# Patient Record
Sex: FEMALE | Race: WHITE | Employment: UNEMPLOYED | URBAN - METROPOLITAN AREA
[De-identification: names, ages, dates, MRNs, and addresses within clinical notes are randomized per-mention and may not be internally consistent; named-entity substitution may affect disease eponyms.]

---

## 2018-05-09 ENCOUNTER — OFFICE VISIT (OUTPATIENT)
Dept: FAMILY MEDICINE CLINIC | Age: 28
End: 2018-05-09

## 2018-05-09 VITALS
HEART RATE: 75 BPM | WEIGHT: 132 LBS | OXYGEN SATURATION: 98 % | DIASTOLIC BLOOD PRESSURE: 64 MMHG | BODY MASS INDEX: 21.31 KG/M2 | SYSTOLIC BLOOD PRESSURE: 108 MMHG

## 2018-05-09 DIAGNOSIS — G43.109 MIGRAINE WITH TYPICAL AURA: ICD-10-CM

## 2018-05-09 DIAGNOSIS — R51.9 CHRONIC DAILY HEADACHE: Primary | ICD-10-CM

## 2018-05-09 DIAGNOSIS — R10.13 EPIGASTRIC PAIN: ICD-10-CM

## 2018-05-09 LAB
A/G RATIO: 1.6 (ref 1.1–2.2)
ALBUMIN SERPL-MCNC: 4.6 G/DL (ref 3.4–5)
ALP BLD-CCNC: 34 U/L (ref 40–129)
ALT SERPL-CCNC: 10 U/L (ref 10–40)
ANION GAP SERPL CALCULATED.3IONS-SCNC: 15 MMOL/L (ref 3–16)
AST SERPL-CCNC: 17 U/L (ref 15–37)
BILIRUB SERPL-MCNC: <0.2 MG/DL (ref 0–1)
BUN BLDV-MCNC: 10 MG/DL (ref 7–20)
CALCIUM SERPL-MCNC: 9.3 MG/DL (ref 8.3–10.6)
CHLORIDE BLD-SCNC: 101 MMOL/L (ref 99–110)
CO2: 26 MMOL/L (ref 21–32)
CREAT SERPL-MCNC: 0.6 MG/DL (ref 0.6–1.1)
GFR AFRICAN AMERICAN: >60
GFR NON-AFRICAN AMERICAN: >60
GLOBULIN: 2.9 G/DL
GLUCOSE BLD-MCNC: 86 MG/DL (ref 70–99)
HCT VFR BLD CALC: 38.2 % (ref 36–48)
HEMOGLOBIN: 12.6 G/DL (ref 12–16)
MCH RBC QN AUTO: 28.8 PG (ref 26–34)
MCHC RBC AUTO-ENTMCNC: 33 G/DL (ref 31–36)
MCV RBC AUTO: 87.3 FL (ref 80–100)
PDW BLD-RTO: 13.9 % (ref 12.4–15.4)
PLATELET # BLD: 266 K/UL (ref 135–450)
PMV BLD AUTO: 10.5 FL (ref 5–10.5)
POTASSIUM SERPL-SCNC: 4.1 MMOL/L (ref 3.5–5.1)
RBC # BLD: 4.38 M/UL (ref 4–5.2)
SODIUM BLD-SCNC: 142 MMOL/L (ref 136–145)
TOTAL PROTEIN: 7.5 G/DL (ref 6.4–8.2)
TSH REFLEX FT4: 1.74 UIU/ML (ref 0.27–4.2)
WBC # BLD: 11.6 K/UL (ref 4–11)

## 2018-05-09 PROCEDURE — 99203 OFFICE O/P NEW LOW 30 MIN: CPT | Performed by: FAMILY MEDICINE

## 2018-05-09 PROCEDURE — 36415 COLL VENOUS BLD VENIPUNCTURE: CPT | Performed by: FAMILY MEDICINE

## 2018-05-09 RX ORDER — RANITIDINE 150 MG/1
150 TABLET ORAL 2 TIMES DAILY
Qty: 60 TABLET | Refills: 5 | Status: ON HOLD | OUTPATIENT
Start: 2018-05-09 | End: 2022-02-28

## 2018-05-09 RX ORDER — NORGESTIMATE-ETHINYL ESTRADIOL 7DAYSX3 28
TABLET ORAL
Refills: 4 | Status: ON HOLD | COMMUNITY
Start: 2018-04-18 | End: 2022-02-28

## 2018-05-09 RX ORDER — AMITRIPTYLINE HYDROCHLORIDE 10 MG/1
10 TABLET, FILM COATED ORAL NIGHTLY
Qty: 30 TABLET | Refills: 5 | Status: ON HOLD | OUTPATIENT
Start: 2018-05-09 | End: 2022-03-02 | Stop reason: HOSPADM

## 2018-05-09 ASSESSMENT — PATIENT HEALTH QUESTIONNAIRE - PHQ9
1. LITTLE INTEREST OR PLEASURE IN DOING THINGS: 1
SUM OF ALL RESPONSES TO PHQ QUESTIONS 1-9: 1
SUM OF ALL RESPONSES TO PHQ9 QUESTIONS 1 & 2: 1
2. FEELING DOWN, DEPRESSED OR HOPELESS: 0

## 2018-05-15 ENCOUNTER — HOSPITAL ENCOUNTER (OUTPATIENT)
Dept: CT IMAGING | Age: 28
Discharge: HOME | End: 2018-05-15
Attending: FAMILY MEDICINE | Admitting: FAMILY MEDICINE

## 2018-05-15 DIAGNOSIS — R51.9 CHRONIC DAILY HEADACHE: ICD-10-CM

## 2018-05-15 DIAGNOSIS — R51.9 HEADACHE: ICD-10-CM

## 2018-06-21 ENCOUNTER — OFFICE VISIT (OUTPATIENT)
Dept: FAMILY MEDICINE CLINIC | Age: 28
End: 2018-06-21

## 2018-06-21 VITALS
BODY MASS INDEX: 21.14 KG/M2 | WEIGHT: 131 LBS | HEART RATE: 70 BPM | SYSTOLIC BLOOD PRESSURE: 112 MMHG | OXYGEN SATURATION: 98 % | DIASTOLIC BLOOD PRESSURE: 70 MMHG

## 2018-06-21 DIAGNOSIS — R10.13 EPIGASTRIC PAIN: Primary | ICD-10-CM

## 2018-06-21 DIAGNOSIS — K21.9 GASTROESOPHAGEAL REFLUX DISEASE WITHOUT ESOPHAGITIS: ICD-10-CM

## 2018-06-21 DIAGNOSIS — F41.9 ANXIETY: ICD-10-CM

## 2018-06-21 PROCEDURE — 99213 OFFICE O/P EST LOW 20 MIN: CPT | Performed by: FAMILY MEDICINE

## 2018-06-21 RX ORDER — OMEPRAZOLE 20 MG/1
20 CAPSULE, DELAYED RELEASE ORAL
Qty: 30 CAPSULE | Refills: 2 | Status: ON HOLD | OUTPATIENT
Start: 2018-06-21 | End: 2022-02-28

## 2018-06-22 ASSESSMENT — ENCOUNTER SYMPTOMS
CONSTIPATION: 0
ABDOMINAL PAIN: 1
BLOOD IN STOOL: 0
DIARRHEA: 0
ABDOMINAL DISTENTION: 0

## 2018-07-20 ENCOUNTER — HOSPITAL ENCOUNTER (OUTPATIENT)
Age: 28
Discharge: HOME | End: 2018-07-20
Payer: COMMERCIAL

## 2018-07-20 PROCEDURE — 83013 H PYLORI (C-13) BREATH: CPT

## 2018-07-22 LAB — H PYLORI BREATH TEST: POSITIVE

## 2018-07-26 ENCOUNTER — TELEPHONE (OUTPATIENT)
Dept: FAMILY MEDICINE CLINIC | Age: 28
End: 2018-07-26

## 2018-07-26 DIAGNOSIS — A04.8 H. PYLORI INFECTION: Primary | ICD-10-CM

## 2018-07-26 RX ORDER — LANSOPRAZOLE, AMOXICILLIN, CLARITHROMYCIN 30-500-500
KIT ORAL
Qty: 14 EACH | Refills: 0 | Status: ON HOLD | OUTPATIENT
Start: 2018-07-26 | End: 2022-02-28

## 2018-10-31 LAB
C. TRACHOMATIS, EXTERNAL RESULT: NEGATIVE
N. GONORRHOEAE, EXTERNAL RESULT: NEGATIVE

## 2018-12-03 ENCOUNTER — HOSPITAL ENCOUNTER (OUTPATIENT)
Age: 28
Discharge: HOME | End: 2018-12-03
Payer: COMMERCIAL

## 2018-12-03 LAB
ABO, EXTERNAL RESULT: NORMAL
ABO/RH: NORMAL
AMPHETAMINE SCREEN, URINE: NORMAL
ANTIBODY SCREEN: NORMAL
BARBITURATE SCREEN URINE: NORMAL
BENZODIAZEPINE SCREEN, URINE: NORMAL
BUPRENORPHINE URINE: NORMAL
CANNABINOID SCREEN URINE: NORMAL
COCAINE METABOLITE SCREEN URINE: NORMAL
HIV AG/AB: NORMAL
HIV ANTIGEN: NORMAL
HIV-1 ANTIBODY: NORMAL
HIV-2 AB: NORMAL
Lab: NORMAL
METHADONE SCREEN, URINE: NORMAL
OPIATE SCREEN URINE: NORMAL
OXYCODONE URINE: NORMAL
PH UA: 7
PHENCYCLIDINE SCREEN URINE: NORMAL
PROPOXYPHENE SCREEN: NORMAL
RHOGAM, EXTERNAL RESULT: POSITIVE

## 2018-12-03 PROCEDURE — 86901 BLOOD TYPING SEROLOGIC RH(D): CPT

## 2018-12-03 PROCEDURE — 86701 HIV-1ANTIBODY: CPT

## 2018-12-03 PROCEDURE — 36415 COLL VENOUS BLD VENIPUNCTURE: CPT

## 2018-12-03 PROCEDURE — 87390 HIV-1 AG IA: CPT

## 2018-12-03 PROCEDURE — 86900 BLOOD TYPING SEROLOGIC ABO: CPT

## 2018-12-03 PROCEDURE — 87086 URINE CULTURE/COLONY COUNT: CPT

## 2018-12-03 PROCEDURE — 86702 HIV-2 ANTIBODY: CPT

## 2018-12-03 PROCEDURE — 86850 RBC ANTIBODY SCREEN: CPT

## 2018-12-04 LAB — URINE CULTURE, ROUTINE: NORMAL

## 2019-01-16 ENCOUNTER — HOSPITAL ENCOUNTER (OUTPATIENT)
Age: 29
Discharge: HOME | End: 2019-01-16
Payer: COMMERCIAL

## 2019-01-16 LAB
BASOPHILS ABSOLUTE: 0.1 K/UL (ref 0–0.2)
BASOPHILS RELATIVE PERCENT: 0.7 %
EOSINOPHILS ABSOLUTE: 0.1 K/UL (ref 0–0.6)
EOSINOPHILS RELATIVE PERCENT: 0.9 %
HCT VFR BLD CALC: 34 % (ref 36–48)
HEMOGLOBIN: 11.5 G/DL (ref 12–16)
LYMPHOCYTES ABSOLUTE: 1.8 K/UL (ref 1–5.1)
LYMPHOCYTES RELATIVE PERCENT: 18.2 %
MCH RBC QN AUTO: 30.9 PG (ref 26–34)
MCHC RBC AUTO-ENTMCNC: 33.8 G/DL (ref 31–36)
MCV RBC AUTO: 91.5 FL (ref 80–100)
MONOCYTES ABSOLUTE: 0.7 K/UL (ref 0–1.3)
MONOCYTES RELATIVE PERCENT: 7.2 %
NEUTROPHILS ABSOLUTE: 7.3 K/UL (ref 1.7–7.7)
NEUTROPHILS RELATIVE PERCENT: 73 %
PDW BLD-RTO: 14.3 % (ref 12.4–15.4)
PLATELET # BLD: 250 K/UL (ref 135–450)
PMV BLD AUTO: 9.9 FL (ref 5–10.5)
RBC # BLD: 3.71 M/UL (ref 4–5.2)
RUBELLA ANTIBODY IGG: 174.9 IU/ML
WBC # BLD: 10 K/UL (ref 4–11)

## 2019-01-16 PROCEDURE — 85025 COMPLETE CBC W/AUTO DIFF WBC: CPT

## 2019-01-16 PROCEDURE — 86704 HEP B CORE ANTIBODY TOTAL: CPT

## 2019-01-16 PROCEDURE — 86762 RUBELLA ANTIBODY: CPT

## 2019-01-16 PROCEDURE — 36415 COLL VENOUS BLD VENIPUNCTURE: CPT

## 2019-01-18 LAB — HEPATITIS B CORE TOTAL ANTIBODY: NEGATIVE

## 2019-03-20 ENCOUNTER — HOSPITAL ENCOUNTER (OUTPATIENT)
Age: 29
Discharge: HOME | End: 2019-03-20
Payer: COMMERCIAL

## 2019-03-20 LAB
BASOPHILS ABSOLUTE: 0 K/UL (ref 0–0.2)
BASOPHILS RELATIVE PERCENT: 0.3 %
EOSINOPHILS ABSOLUTE: 0.1 K/UL (ref 0–0.6)
EOSINOPHILS RELATIVE PERCENT: 0.7 %
GLUCOSE CHALLENGE: 141 MG/DL
HCT VFR BLD CALC: 32.1 % (ref 36–48)
HEMOGLOBIN: 10.6 G/DL (ref 12–16)
LYMPHOCYTES ABSOLUTE: 1.9 K/UL (ref 1–5.1)
LYMPHOCYTES RELATIVE PERCENT: 16.7 %
MCH RBC QN AUTO: 31.1 PG (ref 26–34)
MCHC RBC AUTO-ENTMCNC: 33.1 G/DL (ref 31–36)
MCV RBC AUTO: 93.8 FL (ref 80–100)
MONOCYTES ABSOLUTE: 0.8 K/UL (ref 0–1.3)
MONOCYTES RELATIVE PERCENT: 7.1 %
NEUTROPHILS ABSOLUTE: 8.5 K/UL (ref 1.7–7.7)
NEUTROPHILS RELATIVE PERCENT: 75.2 %
PDW BLD-RTO: 12.7 % (ref 12.4–15.4)
PLATELET # BLD: 223 K/UL (ref 135–450)
PMV BLD AUTO: 8 FL (ref 5–10.5)
RBC # BLD: 3.43 M/UL (ref 4–5.2)
WBC # BLD: 11.3 K/UL (ref 4–11)

## 2019-03-20 PROCEDURE — 82950 GLUCOSE TEST: CPT

## 2019-03-20 PROCEDURE — 36415 COLL VENOUS BLD VENIPUNCTURE: CPT

## 2019-03-20 PROCEDURE — 85025 COMPLETE CBC W/AUTO DIFF WBC: CPT

## 2019-03-25 ENCOUNTER — HOSPITAL ENCOUNTER (OUTPATIENT)
Age: 29
Discharge: HOME | End: 2019-03-25
Payer: COMMERCIAL

## 2019-03-25 LAB
GLUCOSE FASTING: 81 MG/DL
GLUCOSE TOLERANCE TEST 1 HOUR: 117 MG/DL
GLUCOSE TOLERANCE TEST 2 HOUR: 118 MG/DL
GLUCOSE TOLERANCE TEST 3 HOUR: 84 MG/DL

## 2019-03-25 PROCEDURE — 82951 GLUCOSE TOLERANCE TEST (GTT): CPT

## 2019-03-25 PROCEDURE — 36415 COLL VENOUS BLD VENIPUNCTURE: CPT

## 2019-03-25 PROCEDURE — 82952 GTT-ADDED SAMPLES: CPT

## 2019-03-28 ENCOUNTER — HOSPITAL ENCOUNTER (OUTPATIENT)
Age: 29
Discharge: HOME | End: 2019-03-28
Attending: OBSTETRICS & GYNECOLOGY | Admitting: OBSTETRICS & GYNECOLOGY
Payer: COMMERCIAL

## 2019-03-28 ENCOUNTER — NURSE TRIAGE (OUTPATIENT)
Dept: OTHER | Facility: CLINIC | Age: 29
End: 2019-03-28

## 2019-03-28 VITALS — DIASTOLIC BLOOD PRESSURE: 76 MMHG | SYSTOLIC BLOOD PRESSURE: 135 MMHG | RESPIRATION RATE: 16 BRPM | HEART RATE: 87 BPM

## 2019-03-28 PROBLEM — Z91.81 STATUS POST FALL: Status: ACTIVE | Noted: 2019-03-28

## 2019-03-28 PROCEDURE — 99211 OFF/OP EST MAY X REQ PHY/QHP: CPT

## 2019-03-28 RX ORDER — FERROUS SULFATE 325(65) MG
325 TABLET ORAL
Status: ON HOLD | COMMUNITY
End: 2019-06-21 | Stop reason: HOSPADM

## 2019-03-28 NOTE — FLOWSHEET NOTE
Pt verbalized understanding of verbal and written discharge instructions and denies having questions at this time. Pt able to ambulate off unit, undelivered, in stable condition, and accompanied by .

## 2019-03-28 NOTE — PROGRESS NOTES
Kumar Stevenson CNM notified of patient arrival and details of fall. Orders received to continue EFM and have house MD perform ultrasound.

## 2019-03-28 NOTE — PROGRESS NOTES
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 She denies any pain. States baby is moving well and denies cramping, leakage of fluid, and vaginal bleeding. EFM placed.

## 2019-03-28 NOTE — H&P
[address] [facility]   Obstetrics Triage History and Physical        CHIEF COMPLAINT:  Charles Hooker while working in ED here at about 37 today and landed on abdomen. No known injury. Feeling baby move. No LOF or bleeding. HISTORY OF PRESENT ILLNESS:      The patient is a 33 y.o.   parity  at weeks. Patient presents with a chief complaint as above and is being admitted to triage for observation s/p fall. DATES:    Estimated Due Date:  19    PRENATAL CARE:    Provider: Joseline Barraza CNM         Past Medical History:        Diagnosis Date    Anemia     Taking iron with pregnancy    H. pylori infection 2018    Migraine     has aura     Past Surgical History:    History reviewed. No pertinent surgical history. Social History:    TOBACCO:    Family History:       Problem Relation Age of Onset    High Cholesterol Mother     Cancer Maternal Grandmother         lung    Diabetes Maternal Aunt     COPD Maternal Aunt     Seizures Maternal Aunt         epilepsy     Medications Prior to Admission:  Medications Prior to Admission: Prenatal MV-Min-Fe Fum-FA-DHA (PRENATAL 1 PO), Take by mouth  ferrous sulfate 325 (65 Fe) MG tablet, Take 325 mg by mouth daily (with breakfast)  amoxicillin-clarithromycin-lansoprazole (PREVPAC) combo pack, UAD  omeprazole (PRILOSEC) 20 MG delayed release capsule, Take 1 capsule by mouth every morning (before breakfast) To replace the zantac  TRINESSA, 28, 0.18/0.215/0.25 MG-35 MCG TABS, TK 1 T PO QD  ranitidine (ZANTAC) 150 MG tablet, Take 1 tablet by mouth 2 times daily  amitriptyline (ELAVIL) 10 MG tablet, Take 1 tablet by mouth nightly  SUMAtriptan (IMITREX) 50 MG tablet, Take 1 tablet by mouth once as needed for Migraine  butalbital-aspirin-caffeine (FIORINAL) -40 MG capsule, Take 1 capsule by mouth every 4 hours as needed for Headaches for up to 30 days.   ondansetron (ZOFRAN) 4 MG tablet, Take 1 tablet by mouth every 8 hours as needed for Nausea     Allergies: Patient has no known allergies.     REVIEW OF SYSTEMS:    No specific complaints       PHYSICAL EXAM:    General appearance:  awake, alert, cooperative, no apparent distress, and appears stated age  Neurologic:  Oriented  Lungs:  Breathing unlabored  Heart:    Abdomen:  Soft, gravid  Fetal heart rate:  135  Pelvis:    Cervix:    DILATION:    EFFACEMENT:     STATION:      Contraction frequency:  None  Membranes:  intact      ASSESSMENT:    32 yo  at 28.6 weeks EGA  S/P fall on abdomen  FHR category 1      PLAN:     Observe X 4 hours then reassess  Discussed plan with Dr. Maldonado Peterson monitoring  Reevaluate in 4 hours/prn    Erica Quigley CNM

## 2019-04-01 ENCOUNTER — NURSE ONLY (OUTPATIENT)
Dept: FAMILY MEDICINE CLINIC | Age: 29
End: 2019-04-01
Payer: COMMERCIAL

## 2019-04-01 DIAGNOSIS — Z23 NEED FOR TDAP VACCINATION: Primary | ICD-10-CM

## 2019-04-01 PROCEDURE — 90715 TDAP VACCINE 7 YRS/> IM: CPT | Performed by: FAMILY MEDICINE

## 2019-04-01 PROCEDURE — 90471 IMMUNIZATION ADMIN: CPT | Performed by: FAMILY MEDICINE

## 2019-05-01 ENCOUNTER — HOSPITAL ENCOUNTER (OUTPATIENT)
Age: 29
Discharge: HOME | End: 2019-05-01
Payer: COMMERCIAL

## 2019-05-01 LAB — HEPATITIS B SURFACE ANTIGEN INTERPRETATION: NORMAL

## 2019-05-01 PROCEDURE — 36415 COLL VENOUS BLD VENIPUNCTURE: CPT

## 2019-05-01 PROCEDURE — 86780 TREPONEMA PALLIDUM: CPT

## 2019-05-01 PROCEDURE — 87340 HEPATITIS B SURFACE AG IA: CPT

## 2019-05-02 LAB — TOTAL SYPHILLIS IGG/IGM: NORMAL

## 2019-05-15 LAB — GBS, EXTERNAL RESULT: NEGATIVE

## 2019-06-18 ENCOUNTER — APPOINTMENT (OUTPATIENT)
Dept: LABOR AND DELIVERY | Age: 29
End: 2019-06-18
Payer: COMMERCIAL

## 2019-06-18 ENCOUNTER — HOSPITAL ENCOUNTER (INPATIENT)
Age: 29
LOS: 3 days | Discharge: HOME | End: 2019-06-21
Attending: OBSTETRICS & GYNECOLOGY | Admitting: OBSTETRICS & GYNECOLOGY
Payer: COMMERCIAL

## 2019-06-18 PROBLEM — Z91.81 STATUS POST FALL: Status: RESOLVED | Noted: 2019-03-28 | Resolved: 2019-06-18

## 2019-06-18 PROBLEM — Z34.90 NORMAL PREGNANCY: Status: ACTIVE | Noted: 2019-06-18

## 2019-06-18 PROBLEM — A04.8 H. PYLORI INFECTION: Status: RESOLVED | Noted: 2018-07-26 | Resolved: 2019-06-18

## 2019-06-18 LAB
ABO/RH: NORMAL
AMPHETAMINE SCREEN, URINE: ABNORMAL
ANTIBODY SCREEN: NORMAL
BARBITURATE SCREEN URINE: POSITIVE
BASOPHILS ABSOLUTE: 0 K/UL (ref 0–0.2)
BASOPHILS RELATIVE PERCENT: 0.3 %
BENZODIAZEPINE SCREEN, URINE: ABNORMAL
BUPRENORPHINE URINE: ABNORMAL
CANNABINOID SCREEN URINE: ABNORMAL
COCAINE METABOLITE SCREEN URINE: ABNORMAL
EOSINOPHILS ABSOLUTE: 0 K/UL (ref 0–0.6)
EOSINOPHILS RELATIVE PERCENT: 0.3 %
HCT VFR BLD CALC: 33.8 % (ref 36–48)
HEMOGLOBIN: 11.6 G/DL (ref 12–16)
LYMPHOCYTES ABSOLUTE: 1.9 K/UL (ref 1–5.1)
LYMPHOCYTES RELATIVE PERCENT: 20.1 %
Lab: ABNORMAL
MCH RBC QN AUTO: 31.4 PG (ref 26–34)
MCHC RBC AUTO-ENTMCNC: 34.2 G/DL (ref 31–36)
MCV RBC AUTO: 91.8 FL (ref 80–100)
METHADONE SCREEN, URINE: ABNORMAL
MONOCYTES ABSOLUTE: 0.6 K/UL (ref 0–1.3)
MONOCYTES RELATIVE PERCENT: 6 %
NEUTROPHILS ABSOLUTE: 6.9 K/UL (ref 1.7–7.7)
NEUTROPHILS RELATIVE PERCENT: 73.3 %
OPIATE SCREEN URINE: ABNORMAL
OXYCODONE URINE: ABNORMAL
PDW BLD-RTO: 12.9 % (ref 12.4–15.4)
PH UA: 7
PHENCYCLIDINE SCREEN URINE: ABNORMAL
PLATELET # BLD: 209 K/UL (ref 135–450)
PMV BLD AUTO: 9.9 FL (ref 5–10.5)
PROPOXYPHENE SCREEN: ABNORMAL
RBC # BLD: 3.69 M/UL (ref 4–5.2)
WBC # BLD: 9.4 K/UL (ref 4–11)

## 2019-06-18 PROCEDURE — 86780 TREPONEMA PALLIDUM: CPT

## 2019-06-18 PROCEDURE — 85025 COMPLETE CBC W/AUTO DIFF WBC: CPT

## 2019-06-18 PROCEDURE — 1220000000 HC SEMI PRIVATE OB R&B

## 2019-06-18 PROCEDURE — 86850 RBC ANTIBODY SCREEN: CPT

## 2019-06-18 PROCEDURE — 80307 DRUG TEST PRSMV CHEM ANLYZR: CPT

## 2019-06-18 PROCEDURE — 6370000000 HC RX 637 (ALT 250 FOR IP): Performed by: ADVANCED PRACTICE MIDWIFE

## 2019-06-18 PROCEDURE — 86900 BLOOD TYPING SEROLOGIC ABO: CPT

## 2019-06-18 PROCEDURE — 86901 BLOOD TYPING SEROLOGIC RH(D): CPT

## 2019-06-18 PROCEDURE — 3E0P7VZ INTRODUCTION OF HORMONE INTO FEMALE REPRODUCTIVE, VIA NATURAL OR ARTIFICIAL OPENING: ICD-10-PCS | Performed by: OBSTETRICS & GYNECOLOGY

## 2019-06-18 RX ORDER — ONDANSETRON 2 MG/ML
4 INJECTION INTRAMUSCULAR; INTRAVENOUS EVERY 6 HOURS PRN
Status: DISCONTINUED | OUTPATIENT
Start: 2019-06-18 | End: 2019-06-19

## 2019-06-18 RX ORDER — SODIUM CHLORIDE 0.9 % (FLUSH) 0.9 %
10 SYRINGE (ML) INJECTION PRN
Status: DISCONTINUED | OUTPATIENT
Start: 2019-06-18 | End: 2019-06-19

## 2019-06-18 RX ORDER — SODIUM CHLORIDE, SODIUM LACTATE, POTASSIUM CHLORIDE, CALCIUM CHLORIDE 600; 310; 30; 20 MG/100ML; MG/100ML; MG/100ML; MG/100ML
INJECTION, SOLUTION INTRAVENOUS CONTINUOUS
Status: DISCONTINUED | OUTPATIENT
Start: 2019-06-18 | End: 2019-06-19

## 2019-06-18 RX ORDER — ACETAMINOPHEN AND CODEINE PHOSPHATE 300; 30 MG/1; MG/1
2 TABLET ORAL NIGHTLY
Status: DISCONTINUED | OUTPATIENT
Start: 2019-06-18 | End: 2019-06-19

## 2019-06-18 RX ORDER — ACETAMINOPHEN 325 MG/1
650 TABLET ORAL EVERY 4 HOURS PRN
Status: DISCONTINUED | OUTPATIENT
Start: 2019-06-18 | End: 2019-06-19

## 2019-06-18 RX ORDER — SODIUM CHLORIDE 0.9 % (FLUSH) 0.9 %
SYRINGE (ML) INJECTION
Status: DISCONTINUED
Start: 2019-06-18 | End: 2019-06-19

## 2019-06-18 RX ORDER — SODIUM CHLORIDE, SODIUM LACTATE, POTASSIUM CHLORIDE, CALCIUM CHLORIDE 600; 310; 30; 20 MG/100ML; MG/100ML; MG/100ML; MG/100ML
INJECTION, SOLUTION INTRAVENOUS
Status: DISCONTINUED
Start: 2019-06-18 | End: 2019-06-19

## 2019-06-18 RX ORDER — DOCUSATE SODIUM 100 MG/1
100 CAPSULE, LIQUID FILLED ORAL 2 TIMES DAILY
Status: DISCONTINUED | OUTPATIENT
Start: 2019-06-18 | End: 2019-06-19

## 2019-06-18 RX ADMIN — ACETAMINOPHEN AND CODEINE PHOSPHATE 2 TABLET: 300; 30 TABLET ORAL at 21:28

## 2019-06-18 RX ADMIN — DINOPROSTONE 10 MG: 10 INSERT VAGINAL at 18:15

## 2019-06-18 ASSESSMENT — PAIN SCALES - GENERAL: PAINLEVEL_OUTOF10: 7

## 2019-06-18 NOTE — PLAN OF CARE
Problem: Anxiety:  Goal: Level of anxiety will decrease  Description  Level of anxiety will decrease  Outcome: Ongoing     Problem: Breathing Pattern - Ineffective:  Goal: Able to breathe comfortably  Description  Able to breathe comfortably  Outcome: Ongoing     Problem: Fluid Volume - Imbalance:  Goal: Absence of imbalanced fluid volume signs and symptoms  Description  Absence of imbalanced fluid volume signs and symptoms  Outcome: Ongoing     Problem: Infection - Intrapartum Infection:  Goal: Will show no infection signs and symptoms  Description  Will show no infection signs and symptoms  Outcome: Ongoing     Problem: Labor Process - Prolonged:  Goal: Labor progression, first stage, within specified pattern  Description  Labor progression, first stage, within specified pattern  Outcome: Ongoing     Problem: Pain - Acute:  Goal: Pain level will decrease  Description  Pain level will decrease  Outcome: Ongoing  Goal: Able to cope with pain  Description  Able to cope with pain  Outcome: Ongoing     Problem: Tissue Perfusion - Uteroplacental, Altered:  Goal: Absence of abnormal fetal heart rate pattern  Description  Absence of abnormal fetal heart rate pattern  Outcome: Ongoing

## 2019-06-18 NOTE — H&P
Department of Obstetrics and Gynecology   Obstetrics History and Physical        CHIEF COMPLAINT:  Here for post-dates IOL    HISTORY OF PRESENT ILLNESS:      The patient is a 30 y.o. female at 36w2d. OB History        1    Para        Term                AB        Living           SAB        TAB        Ectopic        Molar        Multiple        Live Births              Obstetric Comments   G0         Patient presents with a chief complaint as above and is being admitted for induction    Estimated Due Date: Estimated Date of Delivery: 19    PRENATAL CARE:    Complicated by: migraine HA, cluster HA    PAST OB HISTORY:  OB History        1    Para        Term                AB        Living           SAB        TAB        Ectopic        Molar        Multiple        Live Births              Obstetric Comments   G0             Past Medical History:        Diagnosis Date    Anemia     Taking iron with pregnancy    H. pylori infection 2018    Migraine     has aura     Past Surgical History:    History reviewed. No pertinent surgical history. Allergies:  Patient has no known allergies. Social History:    Social History     Socioeconomic History    Marital status: Single     Spouse name: Not on file    Number of children: Not on file    Years of education: Not on file    Highest education level: Not on file   Occupational History    Not on file   Social Needs    Financial resource strain: Not on file    Food insecurity:     Worry: Not on file     Inability: Not on file    Transportation needs:     Medical: Not on file     Non-medical: Not on file   Tobacco Use    Smoking status: Never Smoker    Smokeless tobacco: Never Used   Substance and Sexual Activity    Alcohol use:  Yes     Alcohol/week: 0.0 oz    Drug use: No    Sexual activity: Yes     Partners: Male   Lifestyle    Physical activity:     Days per week: Not on file     Minutes per session: Not on file    Stress: Not on file   Relationships    Social connections:     Talks on phone: Not on file     Gets together: Not on file     Attends [employer] service: Not on file     Active member of club or organization: Not on file     Attends meetings of clubs or organizations: Not on file     Relationship status: Not on file    Intimate partner violence:     Fear of current or ex partner: Not on file     Emotionally abused: Not on file     Physically abused: Not on file     Forced sexual activity: Not on file   Other Topics Concern    Not on file   Social History Narrative    Not on file     Family History:       Problem Relation Age of Onset    High Cholesterol Mother     Cancer Maternal Grandmother         lung    Diabetes Maternal Aunt     COPD Maternal Aunt     Seizures Maternal Aunt         epilepsy     Medications Prior to Admission:  Medications Prior to Admission: Prenatal MV-Min-Fe Fum-FA-DHA (PRENATAL 1 PO), Take by mouth  ferrous sulfate 325 (65 Fe) MG tablet, Take 325 mg by mouth daily (with breakfast)  butalbital-aspirin-caffeine (FIORINAL) -40 MG capsule, Take 1 capsule by mouth every 4 hours as needed for Headaches for up to 30 days.   ondansetron (ZOFRAN) 4 MG tablet, Take 1 tablet by mouth every 8 hours as needed for Nausea  amoxicillin-clarithromycin-lansoprazole (PREVPAC) combo pack, UAD  omeprazole (PRILOSEC) 20 MG delayed release capsule, Take 1 capsule by mouth every morning (before breakfast) To replace the zantac  TRINESSA, 28, 0.18/0.215/0.25 MG-35 MCG TABS, TK 1 T PO QD  ranitidine (ZANTAC) 150 MG tablet, Take 1 tablet by mouth 2 times daily  amitriptyline (ELAVIL) 10 MG tablet, Take 1 tablet by mouth nightly  SUMAtriptan (IMITREX) 50 MG tablet, Take 1 tablet by mouth once as needed for Migraine    REVIEW OF SYSTEMS:    N/A    PHYSICAL EXAM:  Vitals:    06/18/19 1637 06/18/19 1721   BP: 129/71    Pulse: 90    Resp: 16    Temp: 98.5 °F (36.9 °C)    TempSrc: Oral    Weight:  172 lb (78 kg) Height:  5' 6\" (1.676 m)     General appearance:  awake, alert, cooperative, no apparent distress, and appears stated age  Neurologic:  Awake, alert, oriented to name, place and time. Lungs:  No increased work of breathing, good air exchange  Abdomen:  Soft, non tender, gravid, consistent with her gestational age, EFW by Leopold's maneuver was 7.5lbs   Fetal heart rate:  Reassuring.   Pelvis:  Adequate pelvis  Cervix: 1-2/50/-3  Contraction frequency:  Occasional    Membranes:  Intact    Labs:   CBC with Differential:    Lab Results   Component Value Date    WBC 9.4 06/18/2019    RBC 3.69 06/18/2019    HGB 11.6 06/18/2019    HCT 33.8 06/18/2019     06/18/2019    MCV 91.8 06/18/2019    MCH 31.4 06/18/2019    MCHC 34.2 06/18/2019    RDW 12.9 06/18/2019    LYMPHOPCT 20.1 06/18/2019    MONOPCT 6.0 06/18/2019    BASOPCT 0.3 06/18/2019    MONOSABS 0.6 06/18/2019    LYMPHSABS 1.9 06/18/2019    EOSABS 0.0 06/18/2019    BASOSABS 0.0 06/18/2019       ASSESSMENT AND PLAN:    Labor: Admit, anticipate normal delivery, routine labor orders  Fetus: Reassuring  GBS: No  Other: Cervidil  Tylenol #3 tonight for sleep and HA

## 2019-06-19 ENCOUNTER — ANESTHESIA EVENT (OUTPATIENT)
Dept: LABOR AND DELIVERY | Age: 29
End: 2019-06-19
Payer: COMMERCIAL

## 2019-06-19 ENCOUNTER — ANESTHESIA (OUTPATIENT)
Dept: LABOR AND DELIVERY | Age: 29
End: 2019-06-19
Payer: COMMERCIAL

## 2019-06-19 PROBLEM — Z34.90 ENCOUNTER FOR ELECTIVE INDUCTION OF LABOR: Status: ACTIVE | Noted: 2019-06-19

## 2019-06-19 LAB — TOTAL SYPHILLIS IGG/IGM: NORMAL

## 2019-06-19 PROCEDURE — 3700000025 EPIDURAL BLOCK: Performed by: ANESTHESIOLOGY

## 2019-06-19 PROCEDURE — 7200000001 HC VAGINAL DELIVERY

## 2019-06-19 PROCEDURE — 1220000000 HC SEMI PRIVATE OB R&B

## 2019-06-19 PROCEDURE — 51701 INSERT BLADDER CATHETER: CPT

## 2019-06-19 PROCEDURE — 0KQM0ZZ REPAIR PERINEUM MUSCLE, OPEN APPROACH: ICD-10-PCS | Performed by: OBSTETRICS & GYNECOLOGY

## 2019-06-19 PROCEDURE — 6370000000 HC RX 637 (ALT 250 FOR IP): Performed by: ADVANCED PRACTICE MIDWIFE

## 2019-06-19 PROCEDURE — 3E033VJ INTRODUCTION OF OTHER HORMONE INTO PERIPHERAL VEIN, PERCUTANEOUS APPROACH: ICD-10-PCS | Performed by: OBSTETRICS & GYNECOLOGY

## 2019-06-19 PROCEDURE — 2580000003 HC RX 258: Performed by: ADVANCED PRACTICE MIDWIFE

## 2019-06-19 PROCEDURE — 6360000002 HC RX W HCPCS

## 2019-06-19 RX ORDER — IBUPROFEN 800 MG/1
800 TABLET ORAL EVERY 8 HOURS PRN
Status: DISCONTINUED | OUTPATIENT
Start: 2019-06-19 | End: 2019-06-21 | Stop reason: HOSPADM

## 2019-06-19 RX ORDER — DOCUSATE SODIUM 100 MG/1
100 CAPSULE, LIQUID FILLED ORAL 2 TIMES DAILY
Status: DISCONTINUED | OUTPATIENT
Start: 2019-06-19 | End: 2019-06-21 | Stop reason: HOSPADM

## 2019-06-19 RX ORDER — ACETAMINOPHEN 325 MG/1
650 TABLET ORAL EVERY 4 HOURS PRN
Status: DISCONTINUED | OUTPATIENT
Start: 2019-06-19 | End: 2019-06-21 | Stop reason: HOSPADM

## 2019-06-19 RX ORDER — SODIUM CHLORIDE 0.9 % (FLUSH) 0.9 %
10 SYRINGE (ML) INJECTION PRN
Status: DISCONTINUED | OUTPATIENT
Start: 2019-06-19 | End: 2019-06-21 | Stop reason: HOSPADM

## 2019-06-19 RX ORDER — FERROUS SULFATE 325(65) MG
325 TABLET ORAL 2 TIMES DAILY WITH MEALS
Status: DISCONTINUED | OUTPATIENT
Start: 2019-06-19 | End: 2019-06-21 | Stop reason: HOSPADM

## 2019-06-19 RX ORDER — SODIUM CHLORIDE 0.9 % (FLUSH) 0.9 %
10 SYRINGE (ML) INJECTION EVERY 12 HOURS SCHEDULED
Status: DISCONTINUED | OUTPATIENT
Start: 2019-06-19 | End: 2019-06-21 | Stop reason: HOSPADM

## 2019-06-19 RX ORDER — LANOLIN 100 %
OINTMENT (GRAM) TOPICAL PRN
Status: DISCONTINUED | OUTPATIENT
Start: 2019-06-19 | End: 2019-06-21 | Stop reason: HOSPADM

## 2019-06-19 RX ORDER — SODIUM CHLORIDE, SODIUM LACTATE, POTASSIUM CHLORIDE, CALCIUM CHLORIDE 600; 310; 30; 20 MG/100ML; MG/100ML; MG/100ML; MG/100ML
INJECTION, SOLUTION INTRAVENOUS CONTINUOUS
Status: DISCONTINUED | OUTPATIENT
Start: 2019-06-19 | End: 2019-06-21 | Stop reason: HOSPADM

## 2019-06-19 RX ORDER — HYDROCODONE BITARTRATE AND ACETAMINOPHEN 5; 325 MG/1; MG/1
1 TABLET ORAL EVERY 6 HOURS PRN
Status: DISCONTINUED | OUTPATIENT
Start: 2019-06-19 | End: 2019-06-21 | Stop reason: HOSPADM

## 2019-06-19 RX ADMIN — SODIUM CHLORIDE, POTASSIUM CHLORIDE, SODIUM LACTATE AND CALCIUM CHLORIDE: 600; 310; 30; 20 INJECTION, SOLUTION INTRAVENOUS at 01:00

## 2019-06-19 RX ADMIN — HYDROCODONE BITARTRATE AND ACETAMINOPHEN 1 TABLET: 5; 325 TABLET ORAL at 20:14

## 2019-06-19 RX ADMIN — Medication 1 MILLI-UNITS/MIN: at 06:54

## 2019-06-19 RX ADMIN — BENZOCAINE AND LEVOMENTHOL: 200; 5 SPRAY TOPICAL at 12:22

## 2019-06-19 RX ADMIN — SODIUM CHLORIDE, POTASSIUM CHLORIDE, SODIUM LACTATE AND CALCIUM CHLORIDE: 600; 310; 30; 20 INJECTION, SOLUTION INTRAVENOUS at 06:41

## 2019-06-19 RX ADMIN — DOCUSATE SODIUM 100 MG: 100 CAPSULE, LIQUID FILLED ORAL at 13:51

## 2019-06-19 RX ADMIN — WITCH HAZEL 40 EACH: 500 SOLUTION RECTAL; TOPICAL at 12:22

## 2019-06-19 RX ADMIN — SODIUM CHLORIDE, POTASSIUM CHLORIDE, SODIUM LACTATE AND CALCIUM CHLORIDE: 600; 310; 30; 20 INJECTION, SOLUTION INTRAVENOUS at 01:21

## 2019-06-19 RX ADMIN — IBUPROFEN 800 MG: 800 TABLET, FILM COATED ORAL at 13:50

## 2019-06-19 RX ADMIN — DOCUSATE SODIUM 100 MG: 100 CAPSULE, LIQUID FILLED ORAL at 20:14

## 2019-06-19 ASSESSMENT — PAIN SCALES - GENERAL
PAINLEVEL_OUTOF10: 6
PAINLEVEL_OUTOF10: 6

## 2019-06-19 NOTE — PROGRESS NOTES
Pt called out with c/o contraction pain 9/10. Pt requesting epidural.  VE performed 2-3/90/-2. Kaleb Cartwright CNM notified.  Orders received for epidural.

## 2019-06-19 NOTE — LACTATION NOTE
This note was copied from a baby's chart. Lactation Progress Note      Data:   RN requesting initial consult with primip who recently delivered. Infant currently STS starting to suck on hand. Action: Introduced self to patient as Lactation RN, name and phone number written on white board in room. Infant moved into cross cradle position and infant began moving mouth around nipple. After approximately 5 minutes, infant began opening wide, mother shown how to hold breast in a C position and infant latched on with SHERWIN, SRS and AS. Reviewed with mother what to expect over the next  24-48 hours with infant feedings, infant output, and breast care. Educated mother on how to hand express colostrum. Reviewed infant feeding cues and encouraged mother to allow infant to breast feed on demand, a minimum of every 3 hours. If infant sleepy in the first 24 hours, mother instructed to hand express drops of colostrum into infant's mouth. Also encouraged mother to avoid giving infant a pacifier or bottle for at least the first two weeks of life. Binder and breast feeding log reviewed, all questions answered. Response: Pleased with first feed. Encouraged to call Lactation nurse for F/U care as needed.

## 2019-06-19 NOTE — OP NOTE
Department of Obstetrics and Gynecology  Spontaneous Vaginal Delivery Note    Labor & Delivery Summary  Dilation Complete Date: 19  Dilation Complete Time: 910    Pre-operative Diagnosis:  Term pregnancy, complicated by chronic migraine headaches    Post-operative Diagnosis:  Living  infant(s) and Male    Procedure:  Spontaneous vaginal delivery or Repair second degree spontaneous laceration    Surgeon:   Kurt Hanley CNM    Information for the patient's :  Rui Aguiar [[id number]]          Anesthesia:  epidural anesthesia    Estimated blood loss:  350ml    Specimen:  Placenta not sent to pathology     Cord blood sent No    Complications:  none    Condition:  infant stable and skin to skin with the mother and mother stable    Details of Procedure: The patient is a 30 y.o. female at 39w6d   OB History        1    Para        Term                AB        Living           SAB        TAB        Ectopic        Molar        Multiple        Live Births                 who was admitted for induction. She received the following interventions: cervidil, pitocin. Max pitocin was 2 miu. Membranes ruptured spontaneously this am with clear fluid. She was known to be GBS negative and did not receive antibiotic prophylaxis. The patient progressed well,did receive an epidural, became complete and started to push. After pushing for 10 minutes the fetal head was at the perineum, there was a loose nuchal cord that was reduced over the head and the rest of the infant delivered atraumatically and was placed on the mother's abdomen. The infant established good color, tone and cry at delivery. Cord was clamped and cut and infant was placed skin to skin with the mother. The delivery of the placenta was spontaneous. The perineum and vagina were explored and a second degree laceration was repaired in standard fashion. This mother plans to breast feed.  Mother, baby and father entered into the post partum period in excellent condition. Dr Roopa Rodríguez was notified of this birth.

## 2019-06-19 NOTE — ANESTHESIA PRE PROCEDURE
mL/hr at 06/19/19 0121      sodium chloride flush 0.9 % injection 10 mL  10 mL Intravenous PRN Oscar Louis, APRN - CNM        acetaminophen (TYLENOL) tablet 650 mg  650 mg Oral Q4H PRN Oscar Louis, APRN - CNM        docusate sodium (COLACE) capsule 100 mg  100 mg Oral BID Oscar Louis, APRN - CNM        ondansetron [facility]) injection 4 mg  4 mg Intravenous Q6H PRN Oscar Louis, APRN - CNM        benzocaine-menthol (DERMOPLAST) 20-0.5 % spray   Topical PRN [address] [place], APRN - CNM        acetaminophen-codeine (TYLENOL #3) 300-30 MG per tablet 2 tablet  2 tablet Oral Nightly Oscar Louis, APRN - CNM   2 tablet at 06/18/19 2128    sodium chloride flush 0.9 % injection                Allergies:  No Known Allergies    Problem List:    Patient Active Problem List   Diagnosis Code    Normal pregnancy Z34.90       Past Medical History:        Diagnosis Date    Anemia     Taking iron with pregnancy    H. pylori infection 2018    Migraine     has aura       Past Surgical History:  History reviewed. No pertinent surgical history. Social History:    Social History     Tobacco Use    Smoking status: Never Smoker    Smokeless tobacco: Never Used   Substance Use Topics    Alcohol use:  Yes     Alcohol/week: 0.0 oz                                Counseling given: Not Answered      Vital Signs (Current):   Vitals:    06/18/19 2030 06/19/19 0101 06/19/19 0116 06/19/19 0119   BP: 128/80 (!) 143/79 139/77 134/76   Pulse: 85 96 97 92   Resp: 16      Temp: 36.7 °C (98.1 °F)      TempSrc:       Weight:       Height:                                                  BP Readings from Last 3 Encounters:   06/19/19 134/76   03/28/19 135/76   06/21/18 112/70       NPO Status:                                                                                 BMI:   Wt Readings from Last 3 Encounters:   06/18/19 172 lb (78 kg)   06/21/18 131 lb (59.4 kg)   05/09/18 132 lb (59.9 kg)

## 2019-06-19 NOTE — L&D DELIVERY SUMMARY NOTE
[address] [address]                            LABOR AND DELIVERY NOTE    PATIENT NAME: Juan Carlos Van                  :        1990  MED REC NO:   [id number]                          ROOM:       0372  ACCOUNT NO:   [de-identified]                           ADMIT DATE: 2019  PROVIDER:     Ely Briggs CNM    DATE OF PROCEDURE:  2019    DELIVERY SUMMARY    The patient is a 32-year-old  1, para 0 at 40.5 weeks gestation,  who was admitted on 2019 for induction of labor at 40.4 weeks with  her pregnancy course complicated by chronic migraine headaches. She had  been taking Fioricet sparingly through the last couple of weeks for her  chronic migraines. Otherwise, her pregnancy course was uncomplicated. GBS status was negative. The patient received Cervidil last evening,  which did put her into labor. She made cervical change and received an  epidural.  Cervidil was removed this morning at 6 a.m. Pitocin was  started and max Pitocin was 2 milliunits. The patient made good labor  progress. Membranes ruptured spontaneously with clear fluid. She  became complete and pushed for about 10 minutes to  and at 09:49  on 2019, she had a spontaneous vaginal delivery of a liveborn  infant male, Apgar scores were 9 and 9. There was a loose nuchal cord  that was reduced over the fetal head prior to delivery of the shoulders. The infant established good color, tone, and cry at delivery and  required no resuscitation. The placenta delivered spontaneously and was  inspected and found to be intact. Uterus contracted promptly and the  patient did oxytocin and the IV fluids. EBL was 350 mL.   Upon  inspection of the perineum and vagina, the patient was found to have a  second-degree laceration and this was repaired with 2-0 and 3-0 suture  under the existing epidural anesthesia in the usual fashion. Mother,  baby, and father entered into the postpartum period in excellent  condition. This mother does plan to breastfeed her baby. Dr. Vu Wright was notified of this birth.         Joselyn Cushing, CNM    D: 06/19/2019 11:06:33       T: 06/19/2019 15:34:19     [email or web address]  Job#: [id number]     Doc#: [id number]    CC:

## 2019-06-19 NOTE — PLAN OF CARE
Problem: VAGINAL DELIVERY - RECOVERY AND POST PARTUM  Goal: Vital signs are medically acceptable  Outcome: Ongoing  Goal: Patient will remain free of falls  Outcome: Ongoing  Goal: Fundus firm at midline  Outcome: Ongoing  Goal: Moderate rubra without clots, no purulent discharge, no foul smelling lochia  Outcome: Ongoing  Goal: Empties bladder  Outcome: Ongoing  Goal: Verbalizes understanding of normal bowel function resumption  Outcome: Ongoing  Goal: Edema will be absent or minimal  Outcome: Ongoing  Goal: Breasts are soft with nipple integrity intact  Outcome: Ongoing  Goal: Demonstrates appropriate breast feeding techniques  Outcome: Ongoing  Goal: Appropriate behavior observed  Outcome: Ongoing  Goal: Positive Mother-Baby interactions are observed  Outcome: Ongoing  Goal: Perineum intact without discharge or hematoma  Outcome: Ongoing  Goal: Ambulates independently  Outcome: Ongoing

## 2019-06-19 NOTE — PROGRESS NOTES
Updated AKILA Valverde CNM in department on most recent SVE and SROM and pitocin level @ 2mu/min. FHT/ctx pattern reviewed. Will continue to monitor.

## 2019-06-20 PROBLEM — D64.9 ANEMIA: Status: ACTIVE | Noted: 2019-06-20

## 2019-06-20 LAB
BANDED NEUTROPHILS RELATIVE PERCENT: 7 % (ref 0–7)
BASOPHILS ABSOLUTE: 0 K/UL (ref 0–0.2)
BASOPHILS RELATIVE PERCENT: 0 %
EOSINOPHILS ABSOLUTE: 0.3 K/UL (ref 0–0.6)
EOSINOPHILS RELATIVE PERCENT: 2 %
HCT VFR BLD CALC: 30 % (ref 36–48)
HEMOGLOBIN: 10 G/DL (ref 12–16)
LYMPHOCYTES ABSOLUTE: 2.3 K/UL (ref 1–5.1)
LYMPHOCYTES RELATIVE PERCENT: 18 %
MCH RBC QN AUTO: 30.9 PG (ref 26–34)
MCHC RBC AUTO-ENTMCNC: 33.4 G/DL (ref 31–36)
MCV RBC AUTO: 92.5 FL (ref 80–100)
MONOCYTES ABSOLUTE: 0.6 K/UL (ref 0–1.3)
MONOCYTES RELATIVE PERCENT: 5 %
NEUTROPHILS ABSOLUTE: 9.4 K/UL (ref 1.7–7.7)
NEUTROPHILS RELATIVE PERCENT: 68 %
PDW BLD-RTO: 13 % (ref 12.4–15.4)
PLATELET # BLD: 193 K/UL (ref 135–450)
PLATELET SLIDE REVIEW: ADEQUATE
PMV BLD AUTO: 9.5 FL (ref 5–10.5)
RBC # BLD: 3.24 M/UL (ref 4–5.2)
SLIDE REVIEW: ABNORMAL
WBC # BLD: 12.5 K/UL (ref 4–11)

## 2019-06-20 PROCEDURE — 85025 COMPLETE CBC W/AUTO DIFF WBC: CPT

## 2019-06-20 PROCEDURE — 36415 COLL VENOUS BLD VENIPUNCTURE: CPT

## 2019-06-20 PROCEDURE — 1220000000 HC SEMI PRIVATE OB R&B

## 2019-06-20 PROCEDURE — 6370000000 HC RX 637 (ALT 250 FOR IP): Performed by: ADVANCED PRACTICE MIDWIFE

## 2019-06-20 RX ADMIN — IBUPROFEN 800 MG: 800 TABLET, FILM COATED ORAL at 00:42

## 2019-06-20 RX ADMIN — IBUPROFEN 800 MG: 800 TABLET, FILM COATED ORAL at 16:24

## 2019-06-20 RX ADMIN — HYDROCODONE BITARTRATE AND ACETAMINOPHEN 1 TABLET: 5; 325 TABLET ORAL at 05:34

## 2019-06-20 RX ADMIN — DOCUSATE SODIUM 100 MG: 100 CAPSULE, LIQUID FILLED ORAL at 20:55

## 2019-06-20 RX ADMIN — FERROUS SULFATE TAB 325 MG (65 MG ELEMENTAL FE) 325 MG: 325 (65 FE) TAB at 20:54

## 2019-06-20 RX ADMIN — IBUPROFEN 800 MG: 800 TABLET, FILM COATED ORAL at 08:50

## 2019-06-20 ASSESSMENT — PAIN SCALES - GENERAL
PAINLEVEL_OUTOF10: 5

## 2019-06-20 NOTE — LACTATION NOTE
This note was copied from a baby's chart. Lactation Progress Note      Data:     Called to room by RN for sleepy infant who has not wanted to feed this evening. Mother has infant STS at breast.    Action: Assisted mother with expressing drops of colostrum and waking infant. Once awake, infant latched well and had a good feeding. LC remained at the bedside for 15 minutes and infant was still feeding. Encouraged mother to allow him to feed as long as he would like. Encouraged mother to call for f/u assistance. Response: Mother was very happy that infant woke up and ate.

## 2019-06-20 NOTE — LACTATION NOTE
Lactation Progress Note      Data:   F/U on 1/0 breast feeder. Mom states that baby is feeding very well. Currently in nursery for circ. Action: Breast feeding education reviewed. Encouraged to call [facility] for f/u prn. Response: Verbalized understanding and comfortable with breast feeding at this time.

## 2019-06-20 NOTE — PROGRESS NOTES
Department of Obstetrics and Gynecology  Labor and Delivery  CNM Post Partum Progress Note      SUBJECTIVE:  Resting in bed with  and visitors at bedside. States sore but feeling well. Breastfeeding going well. Voiding without difficulty. Appetite good. Lochia small/moderate amount. Plans for baby to be circumcised.     OBJECTIVE:      Vitals:  Vitals:    19 0030   BP: 110/75   Pulse: 87   Resp: 16   Temp: 98 °F (36.7 °C)       ABDOMEN:  soft, non-distended, non-tender, FF  PERINEUM: with sutures intact    DATA:    CBC:    Lab Results   Component Value Date    WBC 12.5 2019    HGB 10.0 2019    HCT 30.0 2019     2019       ASSESSMENT & PLAN:    30 y.o. OB History        1    Para   1    Term   1            AB        Living   1       SAB        TAB        Ectopic        Molar        Multiple   0    Live Births   1                A: Stable pp day 1   Anemia   Stable male infant    P: Continue pp care   Probable d/c home tomorrow if stable    Anni Mcghee CNM

## 2019-06-21 VITALS
WEIGHT: 172 LBS | RESPIRATION RATE: 16 BRPM | DIASTOLIC BLOOD PRESSURE: 78 MMHG | SYSTOLIC BLOOD PRESSURE: 121 MMHG | HEART RATE: 96 BPM | HEIGHT: 66 IN | BODY MASS INDEX: 27.64 KG/M2 | TEMPERATURE: 98 F

## 2019-06-21 PROBLEM — D64.9 ANEMIA: Status: RESOLVED | Noted: 2019-06-20 | Resolved: 2019-06-21

## 2019-06-21 PROBLEM — Z34.90 NORMAL PREGNANCY: Status: RESOLVED | Noted: 2019-06-18 | Resolved: 2019-06-21

## 2019-06-21 PROCEDURE — 6370000000 HC RX 637 (ALT 250 FOR IP): Performed by: ADVANCED PRACTICE MIDWIFE

## 2019-06-21 RX ORDER — IBUPROFEN 600 MG/1
600 TABLET ORAL EVERY 6 HOURS PRN
COMMUNITY
Start: 2019-06-21 | End: 2021-08-02 | Stop reason: ALTCHOICE

## 2019-06-21 RX ORDER — FERROUS SULFATE 325(65) MG
325 TABLET ORAL 2 TIMES DAILY WITH MEALS
Qty: 30 TABLET | Refills: 3 | Status: SHIPPED | OUTPATIENT
Start: 2019-06-21

## 2019-06-21 RX ORDER — PSEUDOEPHEDRINE HCL 30 MG
100 TABLET ORAL 2 TIMES DAILY PRN
Status: ON HOLD | COMMUNITY
Start: 2019-06-21 | End: 2022-02-28

## 2019-06-21 RX ORDER — LANOLIN 100 %
OINTMENT (GRAM) TOPICAL
Qty: 1 TUBE | Refills: 3 | COMMUNITY
Start: 2019-06-21 | End: 2021-08-02 | Stop reason: ALTCHOICE

## 2019-06-21 RX ORDER — HYDROCODONE BITARTRATE AND ACETAMINOPHEN 5; 325 MG/1; MG/1
1 TABLET ORAL EVERY 6 HOURS PRN
Qty: 12 TABLET | Refills: 0 | Status: SHIPPED | OUTPATIENT
Start: 2019-06-21 | End: 2019-06-24

## 2019-06-21 RX ADMIN — HYDROCODONE BITARTRATE AND ACETAMINOPHEN 1 TABLET: 5; 325 TABLET ORAL at 09:24

## 2019-06-21 RX ADMIN — IBUPROFEN 800 MG: 800 TABLET, FILM COATED ORAL at 04:58

## 2019-06-21 RX ADMIN — DOCUSATE SODIUM 100 MG: 100 CAPSULE, LIQUID FILLED ORAL at 09:24

## 2019-06-21 ASSESSMENT — PAIN SCALES - GENERAL
PAINLEVEL_OUTOF10: 5
PAINLEVEL_OUTOF10: 5

## 2019-06-21 NOTE — FLOWSHEET NOTE
Discharge Phone Call Log  Patient Name: Juliana Llanes     [facility] Care Provider: Duane Cm MD Discharge Date: 2019    Disposition of baby:    Phone Number: [phone] (home)     Attempts to Contact:  Date:    Nurse  Date:    Nurse  Date:    Nurse    Introduce yourself and explain the questionnaire. 1.  Now that you are at home is your pain being well controlled? Y/N   What pain reducing measures are you using? ____________________________________        Information for the patient's :  Graciela Ortega [[id number]]   Delivery Method: Vaginal, Spontaneous    2. Are you having any infant feeding issues? Y/N _____________________________      If breastfeeding, were you satisfied with the breastfeeding support services offered? Y/N  3. Any engorgement problems? Y/N  Have you had to supplement? Y/N  4. Have you made or have you already had your first appointment with the baby's doctor? Y/N If no, do you know when to schedule it? Y/N Do you have a safe crib, bassinet or play yard with a firm mattress for your infant to sleep in at home? Y/N  5. Have you scheduled your follow-up appointment? Y/N  If no, do you know when to schedule it? Y/N  6. Did your nurses and physicians include you in the plan of care, communicating with      you respectfully, and in a manner easy to understand? Y/N       Comments:  ___________________________________________________________  7. Is there anyone in particular you would like to mention who provided care for you?          ____________________________    8. Do you have any questions about your discharge instructions or the notebook? Y/N    9. Did your discharge occur in a timely manner? Y/N        Comments: __________________________________________________________    Remember to describe the hospital survey and ask patient to return it when possible.   Questions During Interview:__________________________________________________________  Teaching

## 2019-06-21 NOTE — DISCHARGE SUMMARY
CNM Obstetric Discharge Summary    Admitting Diagnosis  IUP 40.5 weeks  OB History        1    Para   1    Term   1            AB        Living   1       SAB        TAB        Ectopic        Molar        Multiple   0    Live Births   1                Reasons for Admission on 2019  4:19 PM  Encounter for elective induction of labor [Z34.90]  No comment available  Onset of Labor  Induction of Labor    Prenatal Procedures  None    Intrapartum Procedures        Multiple birth?: no        Spontaneous Vaginal Delivery: See Labor and Delivery Summary       Postpartum Procedures  None    Postpartum/Operative Complications       [place] [facility]  Information for the patient's :  Reina Hunt [[id number]]   male  Birth Weight: 7 lb 8.3 oz (3.41 kg)    Discharge With Mother  Complications: No    Discharge Diagnosis       Discharge Information  Current Discharge Medication List      START taking these medications    Details   HYDROcodone-acetaminophen (NORCO) 5-325 MG per tablet Take 1 tablet by mouth every 6 hours as needed for Pain for up to 3 days. Qty: 12 tablet, Refills: 0    Comments: Reduce doses taken as pain becomes manageable  Associated Diagnoses: Vaginal delivery      ibuprofen (ADVIL;MOTRIN) 600 MG tablet Take 1 tablet by mouth every 6 hours as needed for Pain      benzocaine-menthol (DERMOPLAST) 20-0.5 % AERO spray Apply topically as needed for Pain  Refills: 0      witch hazel-glycerin (TUCKS) pad Place on perineum as needed. Refills: 0      docusate sodium (COLACE, DULCOLAX) 100 MG CAPS Take 100 mg by mouth 2 times daily as needed for Constipation      lanolin ointment Apply topically as needed.   Qty: 1 Tube, Refills: 3         CONTINUE these medications which have CHANGED    Details   ferrous sulfate 325 (65 Fe) MG tablet Take 1 tablet by mouth 2 times daily (with meals)  Qty: 30 tablet, Refills: 3         CONTINUE these medications which have NOT CHANGED    Details   Prenatal MV-Min-Fe Fum-FA-DHA (PRENATAL 1 PO) Take by mouth      butalbital-aspirin-caffeine (FIORINAL) -40 MG capsule Take 1 capsule by mouth every 4 hours as needed for Headaches for up to 30 days. Qty: 42 capsule, Refills: 1      amoxicillin-clarithromycin-lansoprazole (PREVPAC) combo pack UAD  Qty: 14 each, Refills: 0    Associated Diagnoses: H. pylori infection      omeprazole (PRILOSEC) 20 MG delayed release capsule Take 1 capsule by mouth every morning (before breakfast) To replace the zantac  Qty: 30 capsule, Refills: 2    Associated Diagnoses: Gastroesophageal reflux disease without esophagitis      TRINESSA, 28, 0.18/0.215/0.25 MG-35 MCG TABS TK 1 T PO QD  Refills: 4      ranitidine (ZANTAC) 150 MG tablet Take 1 tablet by mouth 2 times daily  Qty: 60 tablet, Refills: 5    Associated Diagnoses: Epigastric pain      amitriptyline (ELAVIL) 10 MG tablet Take 1 tablet by mouth nightly  Qty: 30 tablet, Refills: 5    Associated Diagnoses: Chronic daily headache; Migraine with typical aura      SUMAtriptan (IMITREX) 50 MG tablet Take 1 tablet by mouth once as needed for Migraine  Qty: 9 tablet, Refills: 0      ondansetron (ZOFRAN) 4 MG tablet Take 1 tablet by mouth every 8 hours as needed for Nausea  Qty: 10 tablet, Refills: 0             No discharge procedures on file. Discharge to: Home  Follow up in 6 weeks at office. Accompanied by . Discharge Date: 6/21/19      Comments    Ready for discharge. Reviewed instructions. Appetite good. Voiding without difficulty. Lochia small/mod amount. Breastfeeding going well.  with baby in arms at bedside. VSS. Afebrile  Abdomen soft, NT, FF  Perineum with sutures intact    A: Stable pp day 2   Anemia   Stable male infant s/p circumcision    P: D/C home today   RTO 6 weeks/ call prn    Shaan Babcock CNM

## 2019-06-21 NOTE — LACTATION NOTE
This note was copied from a baby's chart. Introduced self to patient as Lactation RN, name and phone number written on white board in room. Infant was circd today and mother stated he has been very tired. Assured her this is normal behavior and he should wake in time for cluster feeding this evening, encouraged mother to give drops every 3 hours until infant is more awake. Mother instructed to call Lactation nurse for F/U care as needed.

## 2020-05-02 ENCOUNTER — NURSE TRIAGE (OUTPATIENT)
Dept: OTHER | Facility: CLINIC | Age: 30
End: 2020-05-02

## 2020-05-02 NOTE — TELEPHONE ENCOUNTER
fatigue. 9. PREGNANCY OR POSTPARTUM: \"Is there any chance you are pregnant? \" \"When was your last menstrual period? \" \"Did you deliver in the last 2 weeks? \"      She doesn't think so. 10. HIGH RISK: \"Do you have any heart or lung problems? Do you have a weak immune system? \" (e.g., CHF, COPD, asthma, HIV positive, chemotherapy, renal failure, diabetes mellitus, sickle cell anemia)        No    Protocols used: CORONAVIRUS (COVID-19) EXPOSURE-ADULT-    Advised home care for the time being. Discussed self isolation recommendations. Advised to call back if symptoms worsen.

## 2021-07-01 ENCOUNTER — HOSPITAL ENCOUNTER (OUTPATIENT)
Age: 31
Discharge: HOME | End: 2021-07-01
Payer: COMMERCIAL

## 2021-07-01 LAB
C. TRACHOMATIS, EXTERNAL RESULT: NEGATIVE
GONADOTROPIN, CHORIONIC (HCG) QUANT: 568.2 MIU/ML
N. GONORRHOEAE, EXTERNAL RESULT: NEGATIVE

## 2021-07-01 PROCEDURE — 84702 CHORIONIC GONADOTROPIN TEST: CPT

## 2021-07-01 PROCEDURE — 36415 COLL VENOUS BLD VENIPUNCTURE: CPT

## 2021-07-06 LAB
CHLAMYDIA TRACHOMATIS AMPLIFIED DET: NEGATIVE
N GONORRHOEAE AMPLIFIED DET: NEGATIVE
SPECIMEN SOURCE: NORMAL

## 2021-07-20 LAB
ABO, EXTERNAL RESULT: NORMAL
RH FACTOR, EXTERNAL RESULT: POSITIVE

## 2021-07-21 LAB
HEP B, EXTERNAL RESULT: NEGATIVE
HIV, EXTERNAL RESULT: NORMAL
RUBELLA TITER, EXTERNAL RESULT: NORMAL

## 2021-08-02 ENCOUNTER — HOSPITAL ENCOUNTER (EMERGENCY)
Age: 31
Discharge: HOME | End: 2021-08-02
Payer: COMMERCIAL

## 2021-08-02 VITALS
HEART RATE: 81 BPM | DIASTOLIC BLOOD PRESSURE: 56 MMHG | RESPIRATION RATE: 18 BRPM | TEMPERATURE: 98 F | BODY MASS INDEX: 23.78 KG/M2 | OXYGEN SATURATION: 100 % | SYSTOLIC BLOOD PRESSURE: 114 MMHG | WEIGHT: 148 LBS | HEIGHT: 66 IN

## 2021-08-02 DIAGNOSIS — Z3A.09 9 WEEKS GESTATION OF PREGNANCY: Primary | ICD-10-CM

## 2021-08-02 DIAGNOSIS — O21.9 NAUSEA AND VOMITING DURING PREGNANCY PRIOR TO 22 WEEKS GESTATION: ICD-10-CM

## 2021-08-02 LAB
A/G RATIO: 1.3 (ref 1.1–2.2)
ALBUMIN SERPL-MCNC: 4.6 G/DL (ref 3.4–5)
ALP BLD-CCNC: 33 U/L (ref 40–129)
ALT SERPL-CCNC: 9 U/L (ref 10–40)
ANION GAP SERPL CALCULATED.3IONS-SCNC: 12 MMOL/L (ref 3–16)
AST SERPL-CCNC: 27 U/L (ref 15–37)
BASOPHILS ABSOLUTE: 0.1 K/UL (ref 0–0.2)
BASOPHILS RELATIVE PERCENT: 0.6 %
BILIRUB SERPL-MCNC: 0.3 MG/DL (ref 0–1)
BILIRUBIN URINE: NEGATIVE
BLOOD, URINE: NEGATIVE
BUN BLDV-MCNC: 8 MG/DL (ref 7–20)
CALCIUM SERPL-MCNC: 9.5 MG/DL (ref 8.3–10.6)
CHLORIDE BLD-SCNC: 101 MMOL/L (ref 99–110)
CLARITY: CLEAR
CO2: 21 MMOL/L (ref 21–32)
COLOR: YELLOW
CREAT SERPL-MCNC: 0.6 MG/DL (ref 0.6–1.1)
EOSINOPHILS ABSOLUTE: 0 K/UL (ref 0–0.6)
EOSINOPHILS RELATIVE PERCENT: 0.1 %
GFR AFRICAN AMERICAN: >60
GFR NON-AFRICAN AMERICAN: >60
GLOBULIN: 3.6 G/DL
GLUCOSE BLD-MCNC: 100 MG/DL (ref 70–99)
GLUCOSE URINE: NEGATIVE MG/DL
GONADOTROPIN, CHORIONIC (HCG) QUANT: NORMAL MIU/ML
HCT VFR BLD CALC: 39.6 % (ref 36–48)
HEMOGLOBIN: 13.6 G/DL (ref 12–16)
KETONES, URINE: 40 MG/DL
LEUKOCYTE ESTERASE, URINE: NEGATIVE
LYMPHOCYTES ABSOLUTE: 2 K/UL (ref 1–5.1)
LYMPHOCYTES RELATIVE PERCENT: 19.8 %
MCH RBC QN AUTO: 30.6 PG (ref 26–34)
MCHC RBC AUTO-ENTMCNC: 34.3 G/DL (ref 31–36)
MCV RBC AUTO: 89.3 FL (ref 80–100)
MICROSCOPIC EXAMINATION: ABNORMAL
MONOCYTES ABSOLUTE: 0.4 K/UL (ref 0–1.3)
MONOCYTES RELATIVE PERCENT: 3.9 %
NEUTROPHILS ABSOLUTE: 7.5 K/UL (ref 1.7–7.7)
NEUTROPHILS RELATIVE PERCENT: 75.6 %
NITRITE, URINE: NEGATIVE
PDW BLD-RTO: 12.6 % (ref 12.4–15.4)
PH UA: 6 (ref 5–8)
PLATELET # BLD: 274 K/UL (ref 135–450)
PMV BLD AUTO: 8.9 FL (ref 5–10.5)
POTASSIUM REFLEX MAGNESIUM: 4.5 MMOL/L (ref 3.5–5.1)
PROTEIN UA: NEGATIVE MG/DL
RBC # BLD: 4.44 M/UL (ref 4–5.2)
SODIUM BLD-SCNC: 134 MMOL/L (ref 136–145)
SPECIFIC GRAVITY UA: 1.02 (ref 1–1.03)
TOTAL PROTEIN: 8.2 G/DL (ref 6.4–8.2)
URINE REFLEX TO CULTURE: ABNORMAL
URINE TYPE: ABNORMAL
UROBILINOGEN, URINE: 0.2 E.U./DL
WBC # BLD: 9.9 K/UL (ref 4–11)

## 2021-08-02 PROCEDURE — 81003 URINALYSIS AUTO W/O SCOPE: CPT

## 2021-08-02 PROCEDURE — 96374 THER/PROPH/DIAG INJ IV PUSH: CPT

## 2021-08-02 PROCEDURE — 2580000003 HC RX 258: Performed by: NURSE PRACTITIONER

## 2021-08-02 PROCEDURE — 6360000002 HC RX W HCPCS: Performed by: NURSE PRACTITIONER

## 2021-08-02 PROCEDURE — 96375 TX/PRO/DX INJ NEW DRUG ADDON: CPT

## 2021-08-02 PROCEDURE — 80053 COMPREHEN METABOLIC PANEL: CPT

## 2021-08-02 PROCEDURE — 85025 COMPLETE CBC W/AUTO DIFF WBC: CPT

## 2021-08-02 PROCEDURE — 99283 EMERGENCY DEPT VISIT LOW MDM: CPT

## 2021-08-02 PROCEDURE — 84702 CHORIONIC GONADOTROPIN TEST: CPT

## 2021-08-02 RX ORDER — PROMETHAZINE HYDROCHLORIDE 25 MG/1
25 TABLET ORAL 3 TIMES DAILY PRN
Qty: 12 TABLET | Refills: 0 | Status: SHIPPED | OUTPATIENT
Start: 2021-08-02 | End: 2021-08-09

## 2021-08-02 RX ORDER — PROMETHAZINE HYDROCHLORIDE 25 MG/1
25 TABLET ORAL EVERY 6 HOURS PRN
Qty: 20 TABLET | Refills: 0 | Status: SHIPPED | OUTPATIENT
Start: 2021-08-02 | End: 2021-08-09

## 2021-08-02 RX ORDER — SODIUM CHLORIDE, SODIUM LACTATE, POTASSIUM CHLORIDE, CALCIUM CHLORIDE 600; 310; 30; 20 MG/100ML; MG/100ML; MG/100ML; MG/100ML
1000 INJECTION, SOLUTION INTRAVENOUS ONCE
Status: COMPLETED | OUTPATIENT
Start: 2021-08-02 | End: 2021-08-02

## 2021-08-02 RX ORDER — DIPHENHYDRAMINE HYDROCHLORIDE 50 MG/ML
12.5 INJECTION INTRAMUSCULAR; INTRAVENOUS ONCE
Status: COMPLETED | OUTPATIENT
Start: 2021-08-02 | End: 2021-08-02

## 2021-08-02 RX ORDER — METOCLOPRAMIDE HYDROCHLORIDE 5 MG/ML
10 INJECTION INTRAMUSCULAR; INTRAVENOUS ONCE
Status: COMPLETED | OUTPATIENT
Start: 2021-08-02 | End: 2021-08-02

## 2021-08-02 RX ORDER — PROMETHAZINE HYDROCHLORIDE 25 MG/ML
12.5 INJECTION, SOLUTION INTRAMUSCULAR; INTRAVENOUS ONCE
Status: COMPLETED | OUTPATIENT
Start: 2021-08-02 | End: 2021-08-02

## 2021-08-02 RX ORDER — 0.9 % SODIUM CHLORIDE 0.9 %
1000 INTRAVENOUS SOLUTION INTRAVENOUS ONCE
Status: COMPLETED | OUTPATIENT
Start: 2021-08-02 | End: 2021-08-02

## 2021-08-02 RX ADMIN — METOCLOPRAMIDE HYDROCHLORIDE 10 MG: 5 INJECTION INTRAMUSCULAR; INTRAVENOUS at 10:12

## 2021-08-02 RX ADMIN — DIPHENHYDRAMINE HYDROCHLORIDE 12.5 MG: 50 INJECTION, SOLUTION INTRAMUSCULAR; INTRAVENOUS at 10:33

## 2021-08-02 RX ADMIN — SODIUM CHLORIDE 1000 ML: 9 INJECTION, SOLUTION INTRAVENOUS at 10:13

## 2021-08-02 RX ADMIN — PROMETHAZINE HYDROCHLORIDE 12.5 MG: 25 INJECTION INTRAMUSCULAR; INTRAVENOUS at 10:33

## 2021-08-02 RX ADMIN — SODIUM CHLORIDE, POTASSIUM CHLORIDE, SODIUM LACTATE AND CALCIUM CHLORIDE 1000 ML: 600; 310; 30; 20 INJECTION, SOLUTION INTRAVENOUS at 10:37

## 2021-08-02 ASSESSMENT — ENCOUNTER SYMPTOMS
VOMITING: 1
NAUSEA: 1
WHEEZING: 0
COUGH: 0
BACK PAIN: 0
COLOR CHANGE: 0
SHORTNESS OF BREATH: 0
DIARRHEA: 0
ABDOMINAL PAIN: 0

## 2021-08-02 NOTE — ED NOTES
Called Ob/gyn @ 1125   Re: vomiting in pregancy  Dr. Miguel Vázquez @ [facility]  08/02/21 2250

## 2021-08-02 NOTE — ED PROVIDER NOTES
**ADVANCED PRACTICE PROVIDER, I HAVE EVALUATED THIS [facility]  ED  EMERGENCY DEPARTMENT ENCOUNTER      Pt Name: Gael Royal  [facility]:[id number]  Kathya 1990  Date of evaluation: 8/2/2021  Provider: EDWIN Singh CNP      Chief Complaint:    Chief Complaint   Patient presents with    Emesis     9 weeks pregnant. Sent in by ob         Nursing Notes, Past Medical Hx, Past Surgical Hx, Social Hx, Allergies, and Family Hx were all reviewed and agreed with or any disagreements were addressed in the HPI.    HPI: (Location, Duration, Timing, Severity, Quality, Assoc Sx, Context, Modifying factors)    Chief Complaint of nausea and vomiting in pregnancy    This is a  28 y.o. female who presents with nausea and vomiting in pregnancy, she states that she is 9 weeks. She is G-2, P-1, A-0. She denies any bleeding or cramping. She states that she is vomiting 3-4 times a day, states that she is extremely nauseated with this pregnancy, she sees [address]. She denies any pain on exam, no urinary symptoms. No cough, congestion, fever or chills. No chest pain, chest pain or shortness of breath. She states that she has some Zofran at home however, medication is not helping. She called her OB/GYN, was told to come to the ER to be evaluated. She denies any additional complaints, no additional aggravating relieving factors. Patient presents awake, alert and in no acute respiratory stress or toxic appearance. PastMedical/Surgical History:      Diagnosis Date    Anemia     Taking iron with pregnancy    H. pylori infection 2018    Migraine     has aura     History reviewed. No pertinent surgical history.     Medications:  Previous Medications    AMITRIPTYLINE (ELAVIL) 10 MG TABLET    Take 1 tablet by mouth nightly    AMOXICILLIN-CLARITHROMYCIN-LANSOPRAZOLE (PREVPAC) COMBO PACK    UAD    BENZOCAINE-MENTHOL (DERMOPLAST) 20-0.5 % AERO SPRAY    Apply topically as needed for Pain    BUTALBITAL-ASPIRIN-CAFFEINE (FIORINAL) -40 MG CAPSULE    Take 1 capsule by mouth every 4 hours as needed for Headaches for up to 30 days. DOCUSATE SODIUM (COLACE, DULCOLAX) 100 MG CAPS    Take 100 mg by mouth 2 times daily as needed for Constipation    FERROUS SULFATE 325 (65 FE) MG TABLET    Take 1 tablet by mouth 2 times daily (with meals)    OMEPRAZOLE (PRILOSEC) 20 MG DELAYED RELEASE CAPSULE    Take 1 capsule by mouth every morning (before breakfast) To replace the zantac    ONDANSETRON (ZOFRAN) 4 MG TABLET    Take 1 tablet by mouth every 8 hours as needed for Nausea    PRENATAL MV-MIN-FE FUM-FA-DHA (PRENATAL 1 PO)    Take by mouth    RANITIDINE (ZANTAC) 150 MG TABLET    Take 1 tablet by mouth 2 times daily    SUMATRIPTAN (IMITREX) 50 MG TABLET    Take 1 tablet by mouth once as needed for Migraine    TRINESSA, 28, 0.18/0.215/0.25 MG-35 MCG TABS    pregnant         Review of Systems:  (2-9 systems needed)  Review of Systems   Constitutional: Negative for chills and fever. HENT: Negative for congestion. Respiratory: Negative for cough, shortness of breath and wheezing. Cardiovascular: Negative for chest pain. Gastrointestinal: Positive for nausea and vomiting. Negative for abdominal pain and diarrhea. She does report that she is 9 weeks pregnant however, denies any abdominal pain vaginal bleeding or discharge. She reports severe nausea, occasional episodes of vomiting over the past couple of days. Genitourinary: Negative for difficulty urinating, dysuria, frequency and hematuria. She denies any urinary symptoms   Musculoskeletal: Negative for back pain. Skin: Negative for color change. Neurological: Negative for weakness, numbness and headaches. \"Positives and Pertinent negatives as per HPI\"    Physical Exam:  Physical Exam  Vitals and nursing note reviewed. Constitutional:       Appearance: She is well-developed. She is not diaphoretic.    HENT:      Head: Laboratory  [address] [place] [address], [address] [place], [place] [address] [place]   Phone [phone]   CBC WITH AUTO DIFFERENTIAL    Narrative:     Performed at:  [facility]) [address], [address] [place], [place] [address] [place]   Phone [phone]   HCG, QUANTITATIVE, PREGNANCY    Narrative:     Performed at:  [address], [address] [place], [place] [address] [place]   Phone  of labs reviewed and were negative at this time or not returned at the time of this note. RADIOLOGY:   Non-plain film images such as CT, Ultrasound and MRI are read by the radiologist. Danette HOOD APRN - CNP have directly visualized the radiologic plain film image(s) with the below findings:      Interpretation per the Radiologist below, if available at the time of this note:    No orders to display        No results found. MEDICAL DECISION MAKING / ED COURSE:    Because of high probability of sudden clinical deterioration of the patient's condition and risk of further deterioration, critical care time required my full attention to the patient's condition; which included chart data review, documentation, medication ordering, reviewing the patient's old records, reevaluation patient's cardiac, pulmonary and neurological status. Reevaluation of vital signs. Consultations with ED attending and admitting physician. Ordering, interpreting reviewing diagnostic testing. Therefore a critical care time was 20 minutes of direct attention to the patient's condition did not include time spent on procedures.     PROCEDURES:   Procedures    None    Patient was given:  Medications   lactated ringers infusion 1,000 mL (0 mLs Intravenous Stopped 8/2/21 1150)   metoclopramide (REGLAN) injection 10 mg (10 mg Intravenous Given 8/2/21 1012)   0.9 % sodium chloride bolus (0 mLs Intravenous Stopped 8/2/21 1039)   diphenhydrAMINE (BENADRYL) injection 12.5 mg (12.5 mg Intravenous Given 8/2/21 1033) promethazine (PHENERGAN) injection 12.5 mg (12.5 mg Intravenous Given 8/2/21 1033)   Patient complains about nausea and vomiting, she is 9 weeks pregnant however, denies any abdominal pain vaginal bleeding or discharge. She reports severe nausea, occasional episodes of vomiting over the past couple of days. After evaluation and examination patient I gave her IV fluids, ordered urinalysis and blood work, CBC shows no sepsis or anemia. Metabolic panel shows no acute renal insufficiency or failure, no signs of dehydration. However she does have 40 ketones in her urine, this should improve with fluids, no signs of UTI. Patient was given a liter of saline and LR, however after Reglan she started feeling very shaky all over, concerned that maybe this is probably a reaction related to the Reglan, patient was then given Phenergan. Upon reevaluation she reports much improvement of her symptoms. At this time I do not feel assisted perform an additional diagnosis that is related to imaging. However, I did page her OB/GYN on-call to give them an update. At 1158 I spoke with Dr. Frankey Gourd, OB/GYN on-call, we discussed the patient's case at length, he agreed patient could follow-up on outpatient follow-up in the office. Therefore, shared medical decision was made to the patient, her OB/GYN and myself only agreed the patient could be discharged home with outpatient follow-up. Patient was discharged home with referral back to OB/GYN, start Phenergan as needed. Return the ER for worsening or concerning symptoms. She was given diet and instructions on morning sickness to help eating smaller frequent meals. The patient tolerated their visit well. I evaluated the patient. The physician was available for consultation as needed. The patient and / or the family were informed of the results of any tests, a time was given to answer questions, a plan was proposed and they agreed with plan.   Patient and spouse verbalized understanding of discharge instructions and the patient was discharged from the department in stable condition. CLINICAL IMPRESSION:  1. 9 weeks gestation of pregnancy    2. Nausea and vomiting during pregnancy prior to 22 weeks gestation        DISPOSITION Decision To Discharge 08/02/2021 11:57:43 AM      PATIENT REFERRED TO:  Katharine Issa MD  [address] [address]  [phone]    Schedule an appointment as soon as possible for a visit   As needed    [facility], [facility] [address]  [phone]  Schedule an appointment as soon as possible for a visit in 3 days  Follow-up with your OB/GYN in the next 3 to 5 days for reevaluation    [facility]  ED  [facility] [address] [phone]  Go to   If symptoms worsen      DISCHARGE MEDICATIONS:  New Prescriptions    PROMETHAZINE (PHENERGAN) 25 MG TABLET    Take 1 tablet by mouth 3 times daily as needed for Nausea    PROMETHAZINE (PHENERGAN) 25 MG TABLET    Take 1 tablet by mouth every 6 hours as needed for Nausea WARNING:  May cause drowsiness. May impair ability to operate vehicles or machinery. Do not use in combination with alcohol. DISCONTINUED MEDICATIONS:  Discontinued Medications    IBUPROFEN (ADVIL;MOTRIN) 600 MG TABLET    Take 1 tablet by mouth every 6 hours as needed for Pain    LANOLIN OINTMENT    Apply topically as needed.               (Please note the MDM and HPI sections of this note were completed with a voice recognition program.  Efforts were made to edit the dictations but occasionally words are mis-transcribed.)    Electronically signed, EDWIN Valdovinos CNP,          EDWIN Valdovinos CNP  08/02/21 8877

## 2021-12-03 LAB — RPR, EXTERNAL RESULT: NORMAL

## 2022-01-16 ENCOUNTER — HOSPITAL ENCOUNTER (EMERGENCY)
Age: 32
Discharge: HOME | End: 2022-01-16
Attending: EMERGENCY MEDICINE
Payer: COMMERCIAL

## 2022-01-16 ENCOUNTER — ANCILLARY PROCEDURE (OUTPATIENT)
Dept: EMERGENCY DEPT | Age: 32
End: 2022-01-16
Payer: COMMERCIAL

## 2022-01-16 ENCOUNTER — APPOINTMENT (OUTPATIENT)
Dept: CT IMAGING | Age: 32
End: 2022-01-16
Payer: COMMERCIAL

## 2022-01-16 VITALS
BODY MASS INDEX: 27.32 KG/M2 | SYSTOLIC BLOOD PRESSURE: 118 MMHG | OXYGEN SATURATION: 96 % | RESPIRATION RATE: 16 BRPM | TEMPERATURE: 98.8 F | HEIGHT: 66 IN | WEIGHT: 170 LBS | DIASTOLIC BLOOD PRESSURE: 71 MMHG | HEART RATE: 92 BPM

## 2022-01-16 DIAGNOSIS — R07.9 CHEST PAIN, UNSPECIFIED TYPE: Primary | ICD-10-CM

## 2022-01-16 DIAGNOSIS — R42 DIZZINESS: ICD-10-CM

## 2022-01-16 LAB
A/G RATIO: 1.2 (ref 1.1–2.2)
ALBUMIN SERPL-MCNC: 3.7 G/DL (ref 3.4–5)
ALP BLD-CCNC: 83 U/L (ref 40–129)
ALT SERPL-CCNC: 7 U/L (ref 10–40)
ANION GAP SERPL CALCULATED.3IONS-SCNC: 11 MMOL/L (ref 3–16)
ANISOCYTOSIS: ABNORMAL
AST SERPL-CCNC: 14 U/L (ref 15–37)
BANDED NEUTROPHILS RELATIVE PERCENT: 21 % (ref 0–7)
BASOPHILS ABSOLUTE: 0 K/UL (ref 0–0.2)
BASOPHILS RELATIVE PERCENT: 0 %
BILIRUB SERPL-MCNC: <0.2 MG/DL (ref 0–1)
BUN BLDV-MCNC: 9 MG/DL (ref 7–20)
CALCIUM SERPL-MCNC: 8.8 MG/DL (ref 8.3–10.6)
CHLORIDE BLD-SCNC: 101 MMOL/L (ref 99–110)
CO2: 21 MMOL/L (ref 21–32)
CREAT SERPL-MCNC: <0.5 MG/DL (ref 0.6–1.1)
EKG ATRIAL RATE: 99 BPM
EKG DIAGNOSIS: NORMAL
EKG P AXIS: 64 DEGREES
EKG P-R INTERVAL: 134 MS
EKG Q-T INTERVAL: 336 MS
EKG QRS DURATION: 84 MS
EKG QTC CALCULATION (BAZETT): 431 MS
EKG R AXIS: 59 DEGREES
EKG T AXIS: 18 DEGREES
EKG VENTRICULAR RATE: 99 BPM
EOSINOPHILS ABSOLUTE: 0 K/UL (ref 0–0.6)
EOSINOPHILS RELATIVE PERCENT: 0 %
GFR AFRICAN AMERICAN: >60
GFR NON-AFRICAN AMERICAN: >60
GLUCOSE BLD-MCNC: 85 MG/DL (ref 70–99)
HCT VFR BLD CALC: 29.6 % (ref 36–48)
HEMOGLOBIN: 10.1 G/DL (ref 12–16)
HYPOCHROMIA: ABNORMAL
LYMPHOCYTES ABSOLUTE: 2.3 K/UL (ref 1–5.1)
LYMPHOCYTES RELATIVE PERCENT: 21 %
MACROCYTES: ABNORMAL
MCH RBC QN AUTO: 30.9 PG (ref 26–34)
MCHC RBC AUTO-ENTMCNC: 34 G/DL (ref 31–36)
MCV RBC AUTO: 90.7 FL (ref 80–100)
METAMYELOCYTES RELATIVE PERCENT: 3 %
MONOCYTES ABSOLUTE: 0.5 K/UL (ref 0–1.3)
MONOCYTES RELATIVE PERCENT: 5 %
NEUTROPHILS ABSOLUTE: 8.1 K/UL (ref 1.7–7.7)
NEUTROPHILS RELATIVE PERCENT: 50 %
PDW BLD-RTO: 12.4 % (ref 12.4–15.4)
PLATELET # BLD: 248 K/UL (ref 135–450)
PMV BLD AUTO: 8.1 FL (ref 5–10.5)
POTASSIUM REFLEX MAGNESIUM: 3.9 MMOL/L (ref 3.5–5.1)
PRO-BNP: 37 PG/ML (ref 0–124)
RBC # BLD: 3.27 M/UL (ref 4–5.2)
SARS-COV-2, NAAT: NOT DETECTED
SODIUM BLD-SCNC: 133 MMOL/L (ref 136–145)
TOTAL PROTEIN: 6.7 G/DL (ref 6.4–8.2)
TROPONIN: <0.01 NG/ML
WBC # BLD: 10.9 K/UL (ref 4–11)

## 2022-01-16 PROCEDURE — 85025 COMPLETE CBC W/AUTO DIFF WBC: CPT

## 2022-01-16 PROCEDURE — 83880 ASSAY OF NATRIURETIC PEPTIDE: CPT

## 2022-01-16 PROCEDURE — 99285 EMERGENCY DEPT VISIT HI MDM: CPT

## 2022-01-16 PROCEDURE — 93005 ELECTROCARDIOGRAM TRACING: CPT | Performed by: EMERGENCY MEDICINE

## 2022-01-16 PROCEDURE — 87635 SARS-COV-2 COVID-19 AMP PRB: CPT

## 2022-01-16 PROCEDURE — 84484 ASSAY OF TROPONIN QUANT: CPT

## 2022-01-16 PROCEDURE — 80053 COMPREHEN METABOLIC PANEL: CPT

## 2022-01-16 PROCEDURE — 93010 ELECTROCARDIOGRAM REPORT: CPT | Performed by: INTERNAL MEDICINE

## 2022-01-16 PROCEDURE — 6360000004 HC RX CONTRAST MEDICATION: Performed by: EMERGENCY MEDICINE

## 2022-01-16 PROCEDURE — 71260 CT THORAX DX C+: CPT

## 2022-01-16 PROCEDURE — 76815 OB US LIMITED FETUS(S): CPT

## 2022-01-16 RX ADMIN — IOPAMIDOL 75 ML: 755 INJECTION, SOLUTION INTRAVENOUS at 13:01

## 2022-01-16 ASSESSMENT — PAIN DESCRIPTION - PAIN TYPE: TYPE: ACUTE PAIN

## 2022-01-16 ASSESSMENT — PAIN DESCRIPTION - DESCRIPTORS: DESCRIPTORS: PRESSURE

## 2022-01-16 ASSESSMENT — PAIN SCALES - GENERAL: PAINLEVEL_OUTOF10: 6

## 2022-01-16 ASSESSMENT — PAIN DESCRIPTION - LOCATION: LOCATION: CHEST

## 2022-01-16 NOTE — ED NOTES
Pregnancy declaration form signed by patient in order to have CT completed      Nico Faria RN  01/16/22 5065

## 2022-01-16 NOTE — ED PROVIDER NOTES
I independently performed a history and physical on [address] [place] [address]. All diagnostic, treatment, and disposition decisions were made by myself in conjunction with the advanced practice provider. For further details of [place] [address] emergency department encounter, please see the RICHARD/resident's documentation. I personally saw the patient and performed a substantive portion of the visit including all aspects of the medical decision making. Briefly, this is a *61-year-old female presenting for evaluation of chest pain, shortness of breath, lightheadedness in the setting of third trimester pregnancy. She is currently 33 weeks pregnant. She has no previous history of pulmonary embolism, DVT. No leg swelling. She apparently has been getting significantly lightheaded, near syncopal.  Advised to come in from [facility] for evaluation. On exam she arrives with stable vital signs. Bedside echocardiogram does not reveal findings concerning for cardiomyopathy, right heart strain. She has no swelling of the lower extremities. ED evaluation with basic labs, CT of the chest to rule out pulmonary embolism. He was advised on the risks of CT in the setting of pregnancy. EKG  The Ekg interpreted by myself in the emergency department in the absence of a cardiologist.  normal sinus rhythm with a rate of 99, S wave in lead I, inverted T wave in lead III  Axis is   Normal  QTc is  within an acceptable range  Intervals and Durations are unremarkable. No specific ST-T wave changes appreciated. No evidence of acute ischemia.    Prior EKG available for comparison       Orinda Bamberger, MD  01/16/22 2075

## 2022-01-16 NOTE — ED NOTES
1352 - called [facility]   Re: dizziness  1405 - Dr Rivera [place] called back to speak with JORDAN [place]  01/16/22 1408

## 2022-01-16 NOTE — ED NOTES
Denies any abdominal pain, cramping, vaginal discharge or contractions.  States baby is moving per usual.      Jono Brito RN  01/16/22 1804

## 2022-01-19 NOTE — ED PROVIDER NOTES
EMERGENCY DEPARTMENT ENCOUNTER      This patient was not seen and evaluated by the attending physician. Pt Name: Alan Palma  MRN: [id number]  [place] 1990  Date of evaluation: 1/16/2022  Provider: EDWIN AshC  PCP: Destin Paredes MD  ED Attending: Dr. Vicenta Presley    History provided by the patient    CHIEF COMPLAINT:     Chief Complaint   Patient presents with    Chest Pain     patient states that she has been having chest pain and debilitiating dizziness which started a week ago. patient states that it has got worse. patient states that she is 33 weeks pregnant. HISTORY OF PRESENT ILLNESS:      Alan Palma is a 28 y.o. female who presents [facility]  ED with complaints of dizziness and chest pain. Patient states that she has had debilitating dizziness and chest pain with gotten worse over the last couple days states that she has had it for several days now, she is 33 weeks pregnant denies any vaginal bleeding or abdominal pain. Patient states that it does not seem to be exacerbated with changes in position states that she just has persistent dizziness which she calls dizziness but describes it more of a lightheadedness is not a room spinning type feeling. She is resting comfortably in the bed. She is here for further evaluation. Location:chest  Quality:ache  Severity:6  Duration:several days  Modifying factors:none noted    Nursing Notes were reviewed     REVIEW OF SYSTEMS:     Review of Systems  All systems, atotal of 10, are reviewed and negative except for those that were just noted in history present illness. PAST MEDICAL HISTORY:     Past Medical History:   Diagnosis Date    Anemia     Taking iron with pregnancy    H. pylori infection 2018    Migraine     has aura         SURGICAL HISTORY:    History reviewed. No pertinent surgical history.       CURRENT MEDICATIONS:       Discharge Medication List as of 1/16/2022  2:48 PM CONTINUE these medications which have NOT CHANGED    Details   ferrous sulfate 325 (65 Fe) MG tablet Take 1 tablet by mouth 2 times daily (with meals), Disp-30 tablet, R-3Print      Prenatal MV-Min-Fe Fum-FA-DHA (PRENATAL 1 PO) Take by mouthHistorical Med      benzocaine-menthol (DERMOPLAST) 20-0.5 % AERO spray Apply topically as needed for Pain, Topical, PRN Starting Fri 6/21/2019, R-0, OTC      docusate sodium (COLACE, DULCOLAX) 100 MG CAPS Take 100 mg by mouth 2 times daily as needed for ConstipationOTC      amoxicillin-clarithromycin-lansoprazole (PREVPAC) combo pack UAD, Disp-14 each, R-0, Normal      omeprazole (PRILOSEC) 20 MG delayed release capsule Take 1 capsule by mouth every morning (before breakfast) To replace the zantac, Disp-30 capsule, R-2Normal      TRINESSA, 28, 0.18/0.215/0.25 MG-35 MCG TABS pregnant, R-4, DAWHistorical Med      ranitidine (ZANTAC) 150 MG tablet Take 1 tablet by mouth 2 times daily, Disp-60 tablet, R-5Normal      amitriptyline (ELAVIL) 10 MG tablet Take 1 tablet by mouth nightly, Disp-30 tablet, R-5Normal      SUMAtriptan (IMITREX) 50 MG tablet Take 1 tablet by mouth once as needed for Migraine, Disp-9 tablet, R-0Print      butalbital-aspirin-caffeine (FIORINAL) -40 MG capsule Take 1 capsule by mouth every 4 hours as needed for Headaches for up to 30 days. , Disp-42 capsule, R-1Print      ondansetron (ZOFRAN) 4 MG tablet Take 1 tablet by mouth every 8 hours as needed for Nausea, Disp-10 tablet, R-0Print               ALLERGIES:    Patient has no known allergies.     FAMILY HISTORY:       Family History   Problem Relation Age of Onset    High Cholesterol Mother     Cancer Maternal Grandmother         lung    Diabetes Maternal Aunt     COPD Maternal Aunt     Seizures Maternal Aunt         epilepsy          SOCIAL HISTORY:       Social History     Socioeconomic History    Marital status: Single     Spouse name: None    Number of children: None    Years of education: None    Highest education level: None   Occupational History    None   Tobacco Use    Smoking status: Never Smoker    Smokeless tobacco: Never Used   Substance and Sexual Activity    Alcohol use: Not Currently     Alcohol/week: 0.0 standard drinks    Drug use: No    Sexual activity: Yes     Partners: Male   Other Topics Concern    None   Social History Narrative    None     Social Determinants of Health     Financial Resource Strain:     Difficulty of Paying Living Expenses: Not on file   Food Insecurity:     Worried About Running Out of Food in the Last Year: Not on file    Syl of Food in the Last Year: Not on file   Transportation Needs:     Lack of Transportation (Medical): Not on file    Lack of Transportation (Non-Medical):  Not on file   Physical Activity:     Days of Exercise per Week: Not on file    Minutes of Exercise per Session: Not on file   Stress:     Feeling of Stress : Not on file   Social Connections:     Frequency of Communication with Friends and Family: Not on file    Frequency of Social Gatherings with Friends and Family: Not on file    Attends [employer] Services: Not on file    Active Member of [address] [place] [address] or Organizations: Not on file    Attends Club or Organization Meetings: Not on file    Marital Status: Not on file   Intimate Partner Violence:     Fear of Current or Ex-Partner: Not on file    Emotionally Abused: Not on file    Physically Abused: Not on file    Sexually Abused: Not on file   Housing Stability:     Unable to Pay for Housing in the Last Year: Not on file    Number of [place] in the Last Year: Not on file    Unstable Housing in the Last Year: Not on file       SCREENINGS:   [place] Coma Scale  Eye Opening: Spontaneous  Best Verbal Response: Oriented  Best Motor Response: Obeys commands  [place] Coma Scale Score: 15        PHYSICAL EXAM:       ED Triage Vitals [01/16/22 1147]   BP Temp Temp Source Pulse Resp SpO2 Height Weight   124/78 98.8 °F (37.1 °C) Oral 103 16 98 % 5' 6\" (1.676 m) 170 lb (77.1 kg)       Physical Exam    CONSTITUTIONAL: Awake and alert. Cooperative. Well-developed. Well-nourished. Vitals:    01/16/22 1215 01/16/22 1245 01/16/22 1350 01/16/22 1420   BP:  116/74 128/61 118/71   Pulse:  97 95 92   Resp: 18 17 20 16   Temp:       TempSrc:       SpO2: 98% 96% 94% 96%   Weight:       Height:         HENT: Normocephalic. Atraumatic. External ears normal, without discharge. TMs clear bilaterally. No nasal discharge. Oropharynx clear, no erythema. Mucous membranes moist.  EYES: Conjunctiva non-injected, no lid abnormalities noted. No scleral icterus. PERRL. EOM's grossly intact. Anterior chambers clear. NECK: Supple. Normal ROM. No meningismus. No thyroid tenderness or swelling noted. CARDIOVASCULAR: RRR. No Murmer. PULMONARY/CHEST WALL: Effort normal. No tachypnea. Lungs clear to ausculation. ABDOMEN: Normal BS. Soft. Nondistended. No tenderness to palpate. No guarding. No hernias noted. No splenomegaly. Back: Spine is midline. No ecchymosis. No crepitus on palpation. No obvious subluxation of vertebral column. No saddle anesthesia or evidence of cauda equina. /ANORECTAL: Not assessed  MUSKULOSKELETAL: Normal ROM. No acute deformities. No edema. No tenderness to palpate. SKIN: Warm and dry. NEUROLOGICAL:  GCS 15. CN II-XII grossly intact. Strength is 5/5 in allextremities and sensation is intact. PSYCHIATRIC: Normal affect, normal insight and judgement. Alert andoriented x 3.         DIAGNOSTIC RESULTS:     LABS:    Results for orders placed or performed during the hospital encounter of 01/16/22   SARS-CoV-2 NAAT (Rapid)    Specimen: Nasopharyngeal Swab   Result Value Ref Range    SARS-CoV-2, NAAT Not Detected Not Detected   CBC auto differential   Result Value Ref Range    WBC 10.9 4.0 - 11.0 K/uL    RBC 3.27 (L) 4.00 - 5.20 M/uL    Hemoglobin 10.1 (L) 12.0 - 16.0 g/dL    Hematocrit 29.6 (L) 36.0 - 48.0 %    MCV 90.7 80.0 - 100.0 fL    MCH 30.9 26.0 - 34.0 pg    MCHC 34.0 31.0 - 36.0 g/dL    RDW 12.4 12.4 - 15.4 %    Platelets 520 536 - 147 K/uL    MPV 8.1 5.0 - 10.5 fL    Neutrophils % 50.0 %    Lymphocytes % 21.0 %    Monocytes % 5.0 %    Eosinophils % 0.0 %    Basophils % 0.0 %    Neutrophils Absolute 8.1 (H) 1.7 - 7.7 K/uL    Lymphocytes Absolute 2.3 1.0 - 5.1 K/uL    Monocytes Absolute 0.5 0.0 - 1.3 K/uL    Eosinophils Absolute 0.0 0.0 - 0.6 K/uL    Basophils Absolute 0.0 0.0 - 0.2 K/uL    Bands Relative 21 (H) 0 - 7 %    Metamyelocytes Relative 3 (A) %    Anisocytosis Occasional (A)     Macrocytes Occasional (A)     Hypochromia Occasional (A)    Comprehensive Metabolic Panel w/ Reflex to MG   Result Value Ref Range    Sodium 133 (L) 136 - 145 mmol/L    Potassium reflex Magnesium 3.9 3.5 - 5.1 mmol/L    Chloride 101 99 - 110 mmol/L    CO2 21 21 - 32 mmol/L    Anion Gap 11 3 - 16    Glucose 85 70 - 99 mg/dL    BUN 9 7 - 20 mg/dL    CREATININE <0.5 (L) 0.6 - 1.1 mg/dL    GFR Non-African American >60 >60    GFR African American >60 >60    Calcium 8.8 8.3 - 10.6 mg/dL    Total Protein 6.7 6.4 - 8.2 g/dL    Albumin 3.7 3.4 - 5.0 g/dL    Albumin/Globulin Ratio 1.2 1.1 - 2.2    Total Bilirubin <0.2 0.0 - 1.0 mg/dL    Alkaline Phosphatase 83 40 - 129 U/L    ALT 7 (L) 10 - 40 U/L    AST 14 (L) 15 - 37 U/L   Troponin   Result Value Ref Range    Troponin <0.01 <0.01 ng/mL   Brain Natriuretic Peptide   Result Value Ref Range    Pro-BNP 37 0 - 124 pg/mL   EKG 12 Lead   Result Value Ref Range    Ventricular Rate 99 BPM    Atrial Rate 99 BPM    P-R Interval 134 ms    QRS Duration 84 ms    Q-T Interval 336 ms    QTc Calculation (Bazett) 431 ms    P Axis 64 degrees    R Axis 59 degrees    T Axis 18 degrees    Diagnosis       Normal sinus rhythmNormal ECGNo previous ECGs availableConfirmed by Kenna Hobbs MD, Hernan Shepherd ([id number]) on 1/16/2022 2:44:40 PM         RADIOLOGY:  All x-ray studies are viewed/reviewedby me.   Formal interpretations per the radiologist are as follows:      CT CHEST PULMONARY EMBOLISM W CONTRAST   Final Result   No evidence of pulmonary embolism or acute pulmonary abnormality. US ED PREG UTERUS LTD 1 OR MORE FETUSES   Final Result              EKG:  See EKG interpretation by an attending phsyician      PROCEDURES:   N/A    CRITICAL CARE TIME:   N/A    CONSULTS:  IP CONSULT TO OB GYN      EMERGENCYDEPARTMENT COURSE and DIFFERENTIAL DIAGNOSIS/MDM:   Vitals:    Vitals:    01/16/22 1215 01/16/22 1245 01/16/22 1350 01/16/22 1420   BP:  116/74 128/61 118/71   Pulse:  97 95 92   Resp: 18 17 20 16   Temp:       TempSrc:       SpO2: 98% 96% 94% 96%   Weight:       Height:           Patient was given the following medications:  Medications   iopamidol (ISOVUE-370) 76 % injection 75 mL (75 mLs IntraVENous Given 1/16/22 1301)         Patient was evaluated by both myself and Dr. Charla Stock. Patient presented to the emergency room today with complaints of chest pain and lightheadedness and dizziness. Patient is 33 weeks pregnant, physical exam was unremarkable but given her complaints I did feel that we had to rule out a pulmonary embolus. Patient had a CT of her chest which showed no evidence of a pulmonary embolism. Patient laboratory studies were unremarkable, negative troponin. EKG was not ischemic. Patient was evaluated by the attending physician who did a bedside ultrasound both obstetric and cardiac see his notation for details. I did speak with OB/GYN who wanted her COVID tested, they recommend discharge home with follow-up as an outpatient. Patient verbalized understanding of this plan, she can return to ED for any emergent worsening symptoms. She was discharged in good condition. Patient laboratory studies, radiographic imaging, andassessment were all discussed with the patient and/or patient family.   There was shared decision-making between myself, the attending physician, as well as the patient and/or their surrogate and we are all in agreement with discharge home. There was an opportunity for questions and all questions were answered to the best of my ability and to the satisfaction of the patient and/or patient family. FINAL IMPRESSION:      1. Chest pain, unspecified type    2.  Dizziness          DISPOSITION/PLAN:   DISPOSITIONDecision To Discharge      PATIENT REFERRED TO:  Jacob Manjarrez MD  [address]  [phone]    Call   For follow up      DISCHARGE MEDICATIONS:  Discharge Medication List as of 1/16/2022  2:48 PM                     (Please note that portions of this note were completed with a voice recognition program.  Efforts were made to edit the dictations, but occasionally words are mis-transcribed.)    EDWIN Shah - CNP-C (electronically signed)        EDWIN Shah CNP  01/18/22 8770

## 2022-02-02 ENCOUNTER — HOSPITAL ENCOUNTER (OUTPATIENT)
Age: 32
Discharge: HOME | End: 2022-02-02
Attending: OBSTETRICS & GYNECOLOGY | Admitting: OBSTETRICS & GYNECOLOGY
Payer: COMMERCIAL

## 2022-02-02 VITALS
HEIGHT: 66 IN | DIASTOLIC BLOOD PRESSURE: 66 MMHG | RESPIRATION RATE: 16 BRPM | HEART RATE: 93 BPM | TEMPERATURE: 98.5 F | SYSTOLIC BLOOD PRESSURE: 130 MMHG | WEIGHT: 172 LBS | BODY MASS INDEX: 27.64 KG/M2

## 2022-02-02 PROBLEM — M54.9 BACK PAIN DURING PREGNANCY: Status: ACTIVE | Noted: 2022-02-02

## 2022-02-02 PROBLEM — O99.891 BACK PAIN AFFECTING PREGNANCY IN THIRD TRIMESTER: Status: ACTIVE | Noted: 2022-02-02

## 2022-02-02 PROBLEM — M54.9 BACK PAIN AFFECTING PREGNANCY IN THIRD TRIMESTER: Status: ACTIVE | Noted: 2022-02-02

## 2022-02-02 PROBLEM — O99.891 BACK PAIN DURING PREGNANCY: Status: ACTIVE | Noted: 2022-02-02

## 2022-02-02 LAB
BANDED NEUTROPHILS RELATIVE PERCENT: 3 % (ref 0–7)
BASOPHILS ABSOLUTE: 0 K/UL (ref 0–0.2)
BASOPHILS RELATIVE PERCENT: 0 %
EOSINOPHILS ABSOLUTE: 0 K/UL (ref 0–0.6)
EOSINOPHILS RELATIVE PERCENT: 0 %
HCT VFR BLD CALC: 29.8 % (ref 36–48)
HEMOGLOBIN: 10.1 G/DL (ref 12–16)
LYMPHOCYTES ABSOLUTE: 1.6 K/UL (ref 1–5.1)
LYMPHOCYTES RELATIVE PERCENT: 17 %
MCH RBC QN AUTO: 30.8 PG (ref 26–34)
MCHC RBC AUTO-ENTMCNC: 33.9 G/DL (ref 31–36)
MCV RBC AUTO: 90.7 FL (ref 80–100)
METAMYELOCYTES RELATIVE PERCENT: 1 %
MONOCYTES ABSOLUTE: 1.2 K/UL (ref 0–1.3)
MONOCYTES RELATIVE PERCENT: 12 %
NEUTROPHILS ABSOLUTE: 6.9 K/UL (ref 1.7–7.7)
NEUTROPHILS RELATIVE PERCENT: 67 %
PDW BLD-RTO: 12.6 % (ref 12.4–15.4)
PLATELET # BLD: 218 K/UL (ref 135–450)
PMV BLD AUTO: 8.4 FL (ref 5–10.5)
RBC # BLD: 3.28 M/UL (ref 4–5.2)
SLIDE REVIEW: ABNORMAL
WBC # BLD: 9.7 K/UL (ref 4–11)

## 2022-02-02 PROCEDURE — 85025 COMPLETE CBC W/AUTO DIFF WBC: CPT

## 2022-02-02 PROCEDURE — 99211 OFF/OP EST MAY X REQ PHY/QHP: CPT

## 2022-02-02 ASSESSMENT — PAIN DESCRIPTION - DESCRIPTORS
DESCRIPTORS: CRAMPING

## 2022-02-02 NOTE — PROGRESS NOTES
This RN updated S Deimling CNM of pt arrival and that her cramping has not worsened at this time. Pt has been intaking PO fluids. Orders for CBC and continue to monitor.

## 2022-02-02 NOTE — PROGRESS NOTES
Pt verbalized understanding of verbal and written discharge instructions and denies having questions at this time. Pt left OB unit at 1502 ambulatory, undelivered, and in stable condition, accompanied by FOB. Patient is not in active labor.

## 2022-02-02 NOTE — H&P
Department of Obstetrics and Gynecology   Obstetrics History and Physical        CHIEF COMPLAINT:  Back pain and cramps    HISTORY OF PRESENT ILLNESS:      The patient is a 28 y.o. female at 30w2d. OB History        2    Para   1    Term   1            AB        Living   1       SAB        IAB        Ectopic        Molar        Multiple   0    Live Births   1            Patient presents with a chief complaint as above and is being admitted for observation    Estimated Due Date: Estimated Date of Delivery: 3/6/22    PRENATAL CARE:    Complicated by: none    PAST OB HISTORY:  OB History        2    Para   1    Term   1            AB        Living   1       SAB        IAB        Ectopic        Molar        Multiple   0    Live Births   1                Past Medical History:        Diagnosis Date    Anemia     Taking iron with pregnancy    H. pylori infection 2018    Migraine     has aura     Past Surgical History:    History reviewed. No pertinent surgical history. Allergies:  Patient has no known allergies.     Social History:    Social History     Socioeconomic History    Marital status: Single     Spouse name: Not on file    Number of children: Not on file    Years of education: Not on file    Highest education level: Not on file   Occupational History    Not on file   Tobacco Use    Smoking status: Never Smoker    Smokeless tobacco: Never Used   Vaping Use    Vaping Use: Never used   Substance and Sexual Activity    Alcohol use: Not Currently     Alcohol/week: 0.0 standard drinks    Drug use: No    Sexual activity: Yes     Partners: Male   Other Topics Concern    Not on file   Social History Narrative    Not on file     Social Determinants of Health     Financial Resource Strain:     Difficulty of Paying Living Expenses: Not on file   Food Insecurity:     Worried About Running Out of Food in the Last Year: Not on file    Syl of Food in the Last Year: Not on file Transportation Needs:     Lack of Transportation (Medical): Not on file    Lack of Transportation (Non-Medical):  Not on file   Physical Activity:     Days of Exercise per Week: Not on file    Minutes of Exercise per Session: Not on file   Stress:     Feeling of Stress : Not on file   Social Connections:     Frequency of Communication with Friends and Family: Not on file    Frequency of Social Gatherings with Friends and Family: Not on file    Attends [employer] Services: Not on file    Active Member of [address] [place] [address] [employer] or Organizations: Not on file    Attends Club or Organization Meetings: Not on file    Marital Status: Not on file   Intimate Partner Violence:     Fear of Current or Ex-Partner: Not on file    Emotionally Abused: Not on file    Physically Abused: Not on file    Sexually Abused: Not on file   Housing Stability:     Unable to Pay for Housing in the Last Year: Not on file    Number of [place] in the Last Year: Not on file    Unstable Housing in the Last Year: Not on file     Family History:       Problem Relation Age of Onset    High Cholesterol Mother     Cancer Maternal Grandmother         lung    Diabetes Maternal Aunt     COPD Maternal Aunt     Seizures Maternal Aunt         epilepsy     Medications Prior to Admission:  Medications Prior to Admission: ferrous sulfate 325 (65 Fe) MG tablet, Take 1 tablet by mouth 2 times daily (with meals)  Prenatal MV-Min-Fe Fum-FA-DHA (PRENATAL 1 PO), Take by mouth  benzocaine-menthol (DERMOPLAST) 20-0.5 % AERO spray, Apply topically as needed for Pain  docusate sodium (COLACE, DULCOLAX) 100 MG CAPS, Take 100 mg by mouth 2 times daily as needed for Constipation  amoxicillin-clarithromycin-lansoprazole (PREVPAC) combo pack, UAD (Patient taking differently: pregnant)  omeprazole (PRILOSEC) 20 MG delayed release capsule, Take 1 capsule by mouth every morning (before breakfast) To replace the zantac  TRINESSA, 28, 0.18/0.215/0.25 MG-35 MCG TABS, pregnant  ranitidine (ZANTAC) 150 MG tablet, Take 1 tablet by mouth 2 times daily (Patient taking differently: Take 150 mg by mouth 2 times daily pregnant)  amitriptyline (ELAVIL) 10 MG tablet, Take 1 tablet by mouth nightly (Patient taking differently: Take 10 mg by mouth nightly pregnant)  SUMAtriptan (IMITREX) 50 MG tablet, Take 1 tablet by mouth once as needed for Migraine (Patient taking differently: Take 50 mg by mouth once as needed for Migraine pregnant)  butalbital-aspirin-caffeine (FIORINAL) -40 MG capsule, Take 1 capsule by mouth every 4 hours as needed for Headaches for up to 30 days. (Patient taking differently: Take 1 capsule by mouth every 4 hours as needed for Headaches. pregnant)  ondansetron (ZOFRAN) 4 MG tablet, Take 1 tablet by mouth every 8 hours as needed for Nausea    REVIEW OF SYSTEMS:    wnl    PHYSICAL EXAM:  Vitals:    02/02/22 1241 02/02/22 1310   BP: 130/66    Pulse: 93    Resp: 16    Temp: 98.5 °F (36.9 °C)    TempSrc: Oral    Weight:  172 lb (78 kg)   Height:  5' 6\" (1.676 m)     General appearance:  awake, alert, cooperative, no apparent distress, and appears stated age  Neurologic:  Awake, alert, oriented to name, place and time. Lungs:  No increased work of breathing, good air exchange  Abdomen:  Soft, non tender, gravid, consistent with her gestational age, EFW by Leopold's maneuver was 5 1/2#  Fetal heart rate:  Reassuring. Pelvis:  Adequate pelvis  Cervix: 1-2/25%/-3/posterior/mod  Contraction frequency: irritability   Membranes:  Intact  Back: SI pain bilateral, no CVAT      ASSESSMENT AND PLAN:    Back pain in pregnancy-MS origin. Uterine irritability-no cervical change from prior exam 4 hrs ago. H/o post-term pregnancy    Hydration review. Back pain/preg review-heat/rest/Tylenol  PTL review/precautions. [facility] review    ROSALVA Eason

## 2022-02-02 NOTE — PROGRESS NOTES
Pt arrived to triage sent over from the office due to back pain that started yesterday that is continuous. Pt denies gushes of fluid and vaginal bleeding. Baby has been active.

## 2022-02-09 LAB — GBS, EXTERNAL RESULT: NEGATIVE

## 2022-02-22 ENCOUNTER — HOSPITAL ENCOUNTER (OUTPATIENT)
Age: 32
Discharge: HOME | End: 2022-02-26
Payer: COMMERCIAL

## 2022-02-22 LAB — SARS-COV-2: NOT DETECTED

## 2022-02-22 PROCEDURE — U0003 INFECTIOUS AGENT DETECTION BY NUCLEIC ACID (DNA OR RNA); SEVERE ACUTE RESPIRATORY SYNDROME CORONAVIRUS 2 (SARS-COV-2) (CORONAVIRUS DISEASE [COVID-19]), AMPLIFIED PROBE TECHNIQUE, MAKING USE OF HIGH THROUGHPUT TECHNOLOGIES AS DESCRIBED BY CMS-2020-01-R: HCPCS

## 2022-02-22 PROCEDURE — U0005 INFEC AGEN DETEC AMPLI PROBE: HCPCS

## 2022-02-28 ENCOUNTER — APPOINTMENT (OUTPATIENT)
Dept: LABOR AND DELIVERY | Age: 32
End: 2022-02-28
Payer: COMMERCIAL

## 2022-02-28 ENCOUNTER — HOSPITAL ENCOUNTER (INPATIENT)
Age: 32
LOS: 2 days | Discharge: HOME | End: 2022-03-02
Attending: STUDENT IN AN ORGANIZED HEALTH CARE EDUCATION/TRAINING PROGRAM | Admitting: STUDENT IN AN ORGANIZED HEALTH CARE EDUCATION/TRAINING PROGRAM
Payer: COMMERCIAL

## 2022-02-28 LAB
ABO/RH: NORMAL
AMPHETAMINE SCREEN, URINE: NORMAL
ANTIBODY SCREEN: NORMAL
BARBITURATE SCREEN URINE: NORMAL
BASOPHILS ABSOLUTE: 0 K/UL (ref 0–0.2)
BASOPHILS RELATIVE PERCENT: 0.1 %
BENZODIAZEPINE SCREEN, URINE: NORMAL
BUPRENORPHINE URINE: NORMAL
CANNABINOID SCREEN URINE: NORMAL
COCAINE METABOLITE SCREEN URINE: NORMAL
EOSINOPHILS ABSOLUTE: 0 K/UL (ref 0–0.6)
EOSINOPHILS RELATIVE PERCENT: 0.4 %
HCT VFR BLD CALC: 30.9 % (ref 36–48)
HEMOGLOBIN: 10.4 G/DL (ref 12–16)
LYMPHOCYTES ABSOLUTE: 2.2 K/UL (ref 1–5.1)
LYMPHOCYTES RELATIVE PERCENT: 21.3 %
Lab: NORMAL
MCH RBC QN AUTO: 30.4 PG (ref 26–34)
MCHC RBC AUTO-ENTMCNC: 33.8 G/DL (ref 31–36)
MCV RBC AUTO: 90 FL (ref 80–100)
METHADONE SCREEN, URINE: NORMAL
MONOCYTES ABSOLUTE: 0.7 K/UL (ref 0–1.3)
MONOCYTES RELATIVE PERCENT: 6.6 %
NEUTROPHILS ABSOLUTE: 7.4 K/UL (ref 1.7–7.7)
NEUTROPHILS RELATIVE PERCENT: 71.6 %
OPIATE SCREEN URINE: NORMAL
OXYCODONE URINE: NORMAL
PDW BLD-RTO: 12.9 % (ref 12.4–15.4)
PH UA: 5
PHENCYCLIDINE SCREEN URINE: NORMAL
PLATELET # BLD: 218 K/UL (ref 135–450)
PMV BLD AUTO: 8.8 FL (ref 5–10.5)
PROPOXYPHENE SCREEN: NORMAL
RBC # BLD: 3.43 M/UL (ref 4–5.2)
TOTAL SYPHILLIS IGG/IGM: NORMAL
WBC # BLD: 10.4 K/UL (ref 4–11)

## 2022-02-28 PROCEDURE — 80307 DRUG TEST PRSMV CHEM ANLYZR: CPT

## 2022-02-28 PROCEDURE — 6360000002 HC RX W HCPCS: Performed by: ADVANCED PRACTICE MIDWIFE

## 2022-02-28 PROCEDURE — 85025 COMPLETE CBC W/AUTO DIFF WBC: CPT

## 2022-02-28 PROCEDURE — 2580000003 HC RX 258: Performed by: ADVANCED PRACTICE MIDWIFE

## 2022-02-28 PROCEDURE — 6370000000 HC RX 637 (ALT 250 FOR IP): Performed by: ADVANCED PRACTICE MIDWIFE

## 2022-02-28 PROCEDURE — 86780 TREPONEMA PALLIDUM: CPT

## 2022-02-28 PROCEDURE — 86901 BLOOD TYPING SEROLOGIC RH(D): CPT

## 2022-02-28 PROCEDURE — 86850 RBC ANTIBODY SCREEN: CPT

## 2022-02-28 PROCEDURE — 3E033VJ INTRODUCTION OF OTHER HORMONE INTO PERIPHERAL VEIN, PERCUTANEOUS APPROACH: ICD-10-PCS | Performed by: STUDENT IN AN ORGANIZED HEALTH CARE EDUCATION/TRAINING PROGRAM

## 2022-02-28 PROCEDURE — 86900 BLOOD TYPING SEROLOGIC ABO: CPT

## 2022-02-28 PROCEDURE — 1220000000 HC SEMI PRIVATE OB R&B

## 2022-02-28 PROCEDURE — 6370000000 HC RX 637 (ALT 250 FOR IP)

## 2022-02-28 RX ORDER — SODIUM CHLORIDE, SODIUM LACTATE, POTASSIUM CHLORIDE, CALCIUM CHLORIDE 600; 310; 30; 20 MG/100ML; MG/100ML; MG/100ML; MG/100ML
INJECTION, SOLUTION INTRAVENOUS CONTINUOUS
Status: DISCONTINUED | OUTPATIENT
Start: 2022-02-28 | End: 2022-03-01

## 2022-02-28 RX ORDER — DOCUSATE SODIUM 100 MG/1
100 CAPSULE, LIQUID FILLED ORAL 2 TIMES DAILY
Status: DISCONTINUED | OUTPATIENT
Start: 2022-02-28 | End: 2022-03-01

## 2022-02-28 RX ORDER — SODIUM CHLORIDE 0.9 % (FLUSH) 0.9 %
5-40 SYRINGE (ML) INJECTION EVERY 12 HOURS SCHEDULED
Status: DISCONTINUED | OUTPATIENT
Start: 2022-02-28 | End: 2022-03-01

## 2022-02-28 RX ORDER — SODIUM CHLORIDE, SODIUM LACTATE, POTASSIUM CHLORIDE, AND CALCIUM CHLORIDE .6; .31; .03; .02 G/100ML; G/100ML; G/100ML; G/100ML
1000 INJECTION, SOLUTION INTRAVENOUS PRN
Status: DISCONTINUED | OUTPATIENT
Start: 2022-02-28 | End: 2022-03-01

## 2022-02-28 RX ORDER — SODIUM CHLORIDE 9 MG/ML
25 INJECTION, SOLUTION INTRAVENOUS PRN
Status: DISCONTINUED | OUTPATIENT
Start: 2022-02-28 | End: 2022-03-01

## 2022-02-28 RX ORDER — SODIUM CHLORIDE, SODIUM LACTATE, POTASSIUM CHLORIDE, AND CALCIUM CHLORIDE .6; .31; .03; .02 G/100ML; G/100ML; G/100ML; G/100ML
500 INJECTION, SOLUTION INTRAVENOUS PRN
Status: DISCONTINUED | OUTPATIENT
Start: 2022-02-28 | End: 2022-03-01

## 2022-02-28 RX ORDER — ACETAMINOPHEN 325 MG/1
650 TABLET ORAL EVERY 4 HOURS PRN
Status: DISCONTINUED | OUTPATIENT
Start: 2022-02-28 | End: 2022-03-01

## 2022-02-28 RX ORDER — ONDANSETRON 2 MG/ML
4 INJECTION INTRAMUSCULAR; INTRAVENOUS EVERY 6 HOURS PRN
Status: DISCONTINUED | OUTPATIENT
Start: 2022-02-28 | End: 2022-03-01

## 2022-02-28 RX ORDER — SODIUM CHLORIDE 0.9 % (FLUSH) 0.9 %
5-40 SYRINGE (ML) INJECTION PRN
Status: DISCONTINUED | OUTPATIENT
Start: 2022-02-28 | End: 2022-03-01

## 2022-02-28 RX ADMIN — SODIUM CHLORIDE, POTASSIUM CHLORIDE, SODIUM LACTATE AND CALCIUM CHLORIDE: 600; 310; 30; 20 INJECTION, SOLUTION INTRAVENOUS at 21:00

## 2022-02-28 RX ADMIN — Medication 25 MCG: at 08:59

## 2022-02-28 RX ADMIN — Medication 1 MILLI-UNITS/MIN: at 21:02

## 2022-02-28 RX ADMIN — Medication 25 MCG: at 13:02

## 2022-02-28 RX ADMIN — Medication 25 MCG: at 17:04

## 2022-02-28 RX ADMIN — SODIUM CHLORIDE, POTASSIUM CHLORIDE, SODIUM LACTATE AND CALCIUM CHLORIDE: 600; 310; 30; 20 INJECTION, SOLUTION INTRAVENOUS at 08:15

## 2022-02-28 NOTE — H&P
Department of Obstetrics and Gynecology   Obstetrics History and Physical        CHIEF COMPLAINT:  Elective IOL    HISTORY OF PRESENT ILLNESS:      The patient is a 28 y.o. female at 36w3d. OB History        2    Para   1    Term   1            AB        Living   1       SAB        IAB        Ectopic        Molar        Multiple   0    Live Births   1            Patient presents with a chief complaint as above and is being admitted for induction    Estimated Due Date: Estimated Date of Delivery: 3/6/22    PRENATAL CARE:    Complicated by: Mild iron-deficiency anemia    PAST OB HISTORY:  OB History        2    Para   1    Term   1            AB        Living   1       SAB        IAB        Ectopic        Molar        Multiple   0    Live Births   1                Past Medical History:        Diagnosis Date    Anemia     Taking iron with pregnancy    H. pylori infection 2018    Migraine     has aura     Past Surgical History:    History reviewed. No pertinent surgical history. Allergies:  Patient has no known allergies.     Social History:    Social History     Socioeconomic History    Marital status: Single     Spouse name: Not on file    Number of children: Not on file    Years of education: Not on file    Highest education level: Not on file   Occupational History    Not on file   Tobacco Use    Smoking status: Never Smoker    Smokeless tobacco: Never Used   Vaping Use    Vaping Use: Never used   Substance and Sexual Activity    Alcohol use: Not Currently     Alcohol/week: 0.0 standard drinks    Drug use: No    Sexual activity: Yes     Partners: Male   Other Topics Concern    Not on file   Social History Narrative    Not on file     Social Determinants of Health     Financial Resource Strain:     Difficulty of Paying Living Expenses: Not on file   Food Insecurity:     Worried About Running Out of Food in the Last Year: Not on file    [address] in the Last Year: Not on file   Transportation Needs:     Lack of Transportation (Medical): Not on file    Lack of Transportation (Non-Medical):  Not on file   Physical Activity:     Days of Exercise per Week: Not on file    Minutes of Exercise per Session: Not on file   Stress:     Feeling of Stress : Not on file   Social Connections:     Frequency of Communication with Friends and Family: Not on file    Frequency of Social Gatherings with Friends and Family: Not on file    Attends Mosque Services: Not on file    Active Member of [address] [place] [address] [employer] or Organizations: Not on file    Attends Club or Organization Meetings: Not on file    Marital Status: Not on file   Intimate Partner Violence:     Fear of Current or Ex-Partner: Not on file    Emotionally Abused: Not on file    Physically Abused: Not on file    Sexually Abused: Not on file   Housing Stability:     Unable to Pay for Housing in the Last Year: Not on file    Number of [place] in the Last Year: Not on file    Unstable Housing in the Last Year: Not on file     Family History:       Problem Relation Age of Onset    High Cholesterol Mother     Cancer Maternal Grandmother         lung    Diabetes Maternal Aunt     COPD Maternal Aunt     Seizures Maternal Aunt         epilepsy     Medications Prior to Admission:  Medications Prior to Admission: ferrous sulfate 325 (65 Fe) MG tablet, Take 1 tablet by mouth 2 times daily (with meals)  Prenatal MV-Min-Fe Fum-FA-DHA (PRENATAL 1 PO), Take by mouth  [DISCONTINUED] benzocaine-menthol (DERMOPLAST) 20-0.5 % AERO spray, Apply topically as needed for Pain  [DISCONTINUED] docusate sodium (COLACE, DULCOLAX) 100 MG CAPS, Take 100 mg by mouth 2 times daily as needed for Constipation  [DISCONTINUED] amoxicillin-clarithromycin-lansoprazole (PREVPAC) combo pack, UAD (Patient taking differently: pregnant)  [DISCONTINUED] omeprazole (PRILOSEC) 20 MG delayed release capsule, Take 1 capsule by mouth every morning (before breakfast) To replace the zantac  amitriptyline (ELAVIL) 10 MG tablet, Take 1 tablet by mouth nightly (Patient taking differently: Take 10 mg by mouth nightly pregnant)  [DISCONTINUED] TRINESSA, 28, 0.18/0.215/0.25 MG-35 MCG TABS, pregnant  [DISCONTINUED] ranitidine (ZANTAC) 150 MG tablet, Take 1 tablet by mouth 2 times daily (Patient taking differently: Take 150 mg by mouth 2 times daily pregnant)  [DISCONTINUED] SUMAtriptan (IMITREX) 50 MG tablet, Take 1 tablet by mouth once as needed for Migraine (Patient taking differently: Take 50 mg by mouth once as needed for Migraine pregnant)  [DISCONTINUED] butalbital-aspirin-caffeine (FIORINAL) -40 MG capsule, Take 1 capsule by mouth every 4 hours as needed for Headaches for up to 30 days. (Patient taking differently: Take 1 capsule by mouth every 4 hours as needed for Headaches. pregnant)  [DISCONTINUED] ondansetron (ZOFRAN) 4 MG tablet, Take 1 tablet by mouth every 8 hours as needed for Nausea    REVIEW OF SYSTEMS:    Review of Systems - Negative     PHYSICAL EXAM:  Vitals:    02/28/22 0808   BP: 117/61   Pulse: 120   Resp: 16   Temp: 98.1 °F (36.7 °C)   TempSrc: Oral   Weight: 178 lb (80.7 kg)   Height: 5' 6\" (1.676 m)     General appearance:  awake, alert, cooperative, no apparent distress, and appears stated age  Neurologic:  Awake, alert, oriented to name, place and time. Lungs:  No increased work of breathing, good air exchange  Abdomen:  Soft, non tender, gravid, consistent with her gestational age, EFW by Leopold's maneuver was 7 lb   Fetal heart rate:  Reassuring.   Pelvis:  Adequate pelvis  Cervix: FT/50/-2  Contraction frequency:      Membranes:  Intact    Labs:   CBC:   Lab Results   Component Value Date    WBC 10.4 02/28/2022    RBC 3.43 02/28/2022    HGB 10.4 02/28/2022    HCT 30.9 02/28/2022    MCV 90.0 02/28/2022    MCH 30.4 02/28/2022    MCHC 33.8 02/28/2022    RDW 12.9 02/28/2022     02/28/2022    MPV 8.8 02/28/2022       ASSESSMENT AND PLAN:    Labor: Admit, anticipate normal delivery, routine labor orders  Fetus: Reassuring  GBS: Negative  Other: Dr. Ritchie Faye will be notified of admission.

## 2022-03-01 ENCOUNTER — ANESTHESIA (OUTPATIENT)
Dept: LABOR AND DELIVERY | Age: 32
End: 2022-03-01
Payer: COMMERCIAL

## 2022-03-01 ENCOUNTER — ANESTHESIA EVENT (OUTPATIENT)
Dept: LABOR AND DELIVERY | Age: 32
End: 2022-03-01
Payer: COMMERCIAL

## 2022-03-01 PROCEDURE — 51701 INSERT BLADDER CATHETER: CPT

## 2022-03-01 PROCEDURE — 1220000000 HC SEMI PRIVATE OB R&B

## 2022-03-01 PROCEDURE — 6360000002 HC RX W HCPCS: Performed by: ADVANCED PRACTICE MIDWIFE

## 2022-03-01 PROCEDURE — 2500000003 HC RX 250 WO HCPCS: Performed by: NURSE ANESTHETIST, CERTIFIED REGISTERED

## 2022-03-01 PROCEDURE — 7200000001 HC VAGINAL DELIVERY

## 2022-03-01 PROCEDURE — 3700000025 EPIDURAL BLOCK: Performed by: ANESTHESIOLOGY

## 2022-03-01 PROCEDURE — 6370000000 HC RX 637 (ALT 250 FOR IP): Performed by: OBSTETRICS & GYNECOLOGY

## 2022-03-01 PROCEDURE — 0KQM0ZZ REPAIR PERINEUM MUSCLE, OPEN APPROACH: ICD-10-PCS | Performed by: STUDENT IN AN ORGANIZED HEALTH CARE EDUCATION/TRAINING PROGRAM

## 2022-03-01 RX ORDER — IBUPROFEN 800 MG/1
800 TABLET ORAL EVERY 6 HOURS PRN
Status: DISCONTINUED | OUTPATIENT
Start: 2022-03-01 | End: 2022-03-02 | Stop reason: HOSPADM

## 2022-03-01 RX ORDER — BUPIVACAINE HYDROCHLORIDE 5 MG/ML
INJECTION, SOLUTION EPIDURAL; INTRACAUDAL PRN
Status: DISCONTINUED | OUTPATIENT
Start: 2022-03-01 | End: 2022-03-01 | Stop reason: SDUPTHER

## 2022-03-01 RX ORDER — ONDANSETRON 2 MG/ML
4 INJECTION INTRAMUSCULAR; INTRAVENOUS EVERY 6 HOURS PRN
Status: DISCONTINUED | OUTPATIENT
Start: 2022-03-01 | End: 2022-03-02 | Stop reason: HOSPADM

## 2022-03-01 RX ORDER — SODIUM CHLORIDE 0.9 % (FLUSH) 0.9 %
5-40 SYRINGE (ML) INJECTION PRN
Status: DISCONTINUED | OUTPATIENT
Start: 2022-03-01 | End: 2022-03-02 | Stop reason: HOSPADM

## 2022-03-01 RX ORDER — DOCUSATE SODIUM 100 MG/1
100 CAPSULE, LIQUID FILLED ORAL 2 TIMES DAILY PRN
Status: DISCONTINUED | OUTPATIENT
Start: 2022-03-01 | End: 2022-03-02 | Stop reason: HOSPADM

## 2022-03-01 RX ORDER — FERROUS SULFATE 325(65) MG
325 TABLET ORAL 2 TIMES DAILY WITH MEALS
Status: DISCONTINUED | OUTPATIENT
Start: 2022-03-01 | End: 2022-03-02 | Stop reason: HOSPADM

## 2022-03-01 RX ORDER — SIMETHICONE 80 MG
80 TABLET,CHEWABLE ORAL EVERY 6 HOURS PRN
Status: DISCONTINUED | OUTPATIENT
Start: 2022-03-01 | End: 2022-03-02 | Stop reason: HOSPADM

## 2022-03-01 RX ORDER — SODIUM CHLORIDE, SODIUM LACTATE, POTASSIUM CHLORIDE, CALCIUM CHLORIDE 600; 310; 30; 20 MG/100ML; MG/100ML; MG/100ML; MG/100ML
INJECTION, SOLUTION INTRAVENOUS CONTINUOUS
Status: DISCONTINUED | OUTPATIENT
Start: 2022-03-01 | End: 2022-03-02 | Stop reason: HOSPADM

## 2022-03-01 RX ORDER — LANOLIN 100 %
OINTMENT (GRAM) TOPICAL PRN
Status: DISCONTINUED | OUTPATIENT
Start: 2022-03-01 | End: 2022-03-02 | Stop reason: HOSPADM

## 2022-03-01 RX ORDER — SODIUM CHLORIDE 0.9 % (FLUSH) 0.9 %
5-40 SYRINGE (ML) INJECTION EVERY 12 HOURS SCHEDULED
Status: DISCONTINUED | OUTPATIENT
Start: 2022-03-01 | End: 2022-03-02 | Stop reason: HOSPADM

## 2022-03-01 RX ORDER — ACETAMINOPHEN 325 MG/1
650 TABLET ORAL EVERY 4 HOURS PRN
Status: DISCONTINUED | OUTPATIENT
Start: 2022-03-01 | End: 2022-03-02 | Stop reason: HOSPADM

## 2022-03-01 RX ORDER — SODIUM CHLORIDE 9 MG/ML
25 INJECTION, SOLUTION INTRAVENOUS PRN
Status: DISCONTINUED | OUTPATIENT
Start: 2022-03-01 | End: 2022-03-02 | Stop reason: HOSPADM

## 2022-03-01 RX ADMIN — IBUPROFEN 800 MG: 800 TABLET, FILM COATED ORAL at 20:04

## 2022-03-01 RX ADMIN — ACETAMINOPHEN 650 MG: 325 TABLET ORAL at 17:35

## 2022-03-01 RX ADMIN — Medication 15 ML/HR: at 04:04

## 2022-03-01 RX ADMIN — IBUPROFEN 800 MG: 800 TABLET, FILM COATED ORAL at 14:00

## 2022-03-01 RX ADMIN — ONDANSETRON 4 MG: 2 INJECTION INTRAMUSCULAR; INTRAVENOUS at 04:50

## 2022-03-01 RX ADMIN — BUPIVACAINE HYDROCHLORIDE 5 ML: 5 INJECTION, SOLUTION EPIDURAL; INTRACAUDAL; PERINEURAL at 03:59

## 2022-03-01 RX ADMIN — DOCUSATE SODIUM 100 MG: 100 CAPSULE, LIQUID FILLED ORAL at 20:04

## 2022-03-01 ASSESSMENT — PAIN SCALES - GENERAL
PAINLEVEL_OUTOF10: 4
PAINLEVEL_OUTOF10: 4
PAINLEVEL_OUTOF10: 3

## 2022-03-01 NOTE — PROGRESS NOTES
S: Feeling mild ctxs. Baby active. O: VSS, afebrile  FHR- 135, mod variability, +accels, no decels  [place]- q 3-5 min  V/e- 3-4/50/-2 posterior  A/P: IOL s/p cytotec x 3- Start pitocin   Cat 1 FHR tracing. Continue monitoring.

## 2022-03-01 NOTE — ANESTHESIA PROCEDURE NOTES
Epidural Block    Patient location during procedure: OB  Reason for block: labor epidural  Staffing  Resident/CRNA: Jony Tong APRN - CRNA  Preanesthetic Checklist  Completed: patient identified, IV checked, risks and benefits discussed, monitors and equipment checked, pre-op evaluation, timeout performed, anesthesia consent given, oxygen available and patient being monitored  Epidural  Patient position: sitting  Prep: ChloraPrep and site prepped and draped  Patient monitoring: continuous pulse ox and frequent blood pressure checks  Approach: midline  Location: lumbar (1-5)  Injection technique: MAX saline  Provider prep: sterile gloves and mask  Needle  Needle type: Tuohy   Needle gauge: 17 G  Needle length: 3.5 in  Needle insertion depth: 6 cm  Catheter type: side hole  Catheter size: 19 G  Catheter at skin depth: 12 cm  Test dose: negative  Assessment  Hemodynamics: stable  Attempts: 1  Additional Notes  Pt prepped and draped in sterile fashion. Skin wheal with 1% lidocaine. MAX with 3 cc NS, 25g spinal needle inserted per melissa. Clear CSF visualized in hub. Needle withdrawn and catheter threaded. Negative test dose 3cc 1.5% Lidocaine with Epinephrine. Sterile dressing applied.

## 2022-03-01 NOTE — ANESTHESIA PRE PROCEDURE
Department of Anesthesiology  Preprocedure Note       Name:  Juan Gallardo   Age:  28 y.o.  :  1990                                          MRN:  [id number]         Date:  3/1/2022      Surgeon: * No surgeons listed *    Procedure: * No procedures listed *    Medications prior to admission:   Prior to Admission medications    Medication Sig Start Date End Date Taking?  Authorizing Provider   ferrous sulfate 325 (65 Fe) MG tablet Take 1 tablet by mouth 2 times daily (with meals) 19  Yes EDWIN Melendez CNM   Prenatal MV-Min-Fe Fum-FA-DHA (PRENATAL 1 PO) Take by mouth   Yes Historical Provider, MD   amitriptyline (ELAVIL) 10 MG tablet Take 1 tablet by mouth nightly  Patient taking differently: Take 10 mg by mouth nightly pregnant 18   Cosmo Tellez MD       Current medications:    Current Facility-Administered Medications   Medication Dose Route Frequency Provider Last Rate Last Admin    lactated ringers infusion   IntraVENous Continuous Kip Narvaez, APRN -  mL/hr at 22 0815 New Bag at 22 0815    lactated ringers infusion   IntraVENous Continuous Mackenzie Laughter Deimling, APRN -  mL/hr at 22 2100 New Bag at 22 2100    lactated ringers bolus  500 mL IntraVENous PRN [place] Narvaez, APRN - CNM        Or    lactated ringers bolus  1,000 mL IntraVENous PRN Kipousmane Alvaradoe, APRN - CNM        sodium chloride flush 0.9 % injection 5-40 mL  5-40 mL IntraVENous 2 times per day Kip Narvaez, APRN - CNM        sodium chloride flush 0.9 % injection 5-40 mL  5-40 mL IntraVENous PRN Kip Narvaez, APRN - CNM        0.9 % sodium chloride infusion  25 mL IntraVENous PRN Kip Narvaez, APRN - CNM        on[facility]ansetron [facility]) injection 4 mg  4 mg IntraVENous Q6H PRN [place] Narvaez, APRN - CNM        acetaminophen (TYLENOL) tablet 650 mg  650 mg Oral Q4H PRN Kip Narvaez, APRN - CNM  benzocaine-menthol (DERMOPLAST) 20-0.5 % spray   Topical PRN Giselle Agent, APRN - CNM        docusate sodium (COLACE) capsule 100 mg  100 mg Oral BID Giselle Agent, APRN - CNM        miSOPROStol (CYTOTEC) pre-split tablet TABS 25 mcg  25 mcg Vaginal Q4H Giselle Agent, APRN - CNM   25 mcg at 02/28/22 1704    oxytocin (PITOCIN) 30 units in 500 mL infusion  1 john-units/min IntraVENous Continuous Giselle Agent, APRN - CNM 8 mL/hr at 03/01/22 0330 8 john-units/min at 03/01/22 0330       Allergies:  No Known Allergies    Problem List:    Patient Active Problem List   Diagnosis Code    Encounter for elective induction of labor Z34.90    Back pain affecting pregnancy in third trimester O99.891, M54.9    Back pain during pregnancy O99.891, M54.9       Past Medical History:        Diagnosis Date    Anemia     Taking iron with pregnancy    H. pylori infection 2018    Migraine     has aura       Past Surgical History:  History reviewed. No pertinent surgical history.     Social History:    Social History     Tobacco Use    Smoking status: Never Smoker    Smokeless tobacco: Never Used   Substance Use Topics    Alcohol use: Not Currently     Alcohol/week: 0.0 standard drinks                                Counseling given: Not Answered      Vital Signs (Current):   Vitals:    02/28/22 2308 03/01/22 0008 03/01/22 0100 03/01/22 0143   BP: 118/61 (!) 103/53 (!) 108/52 (!) 103/59   Pulse: 85 92 87 86   Resp: 16 16 16 16   Temp:  36.8 °C (98.3 °F)     TempSrc:  Oral     Weight:       Height:                                                  BP Readings from Last 3 Encounters:   03/01/22 (!) 103/59   02/02/22 130/66   01/16/22 118/71       NPO Status:                                                                                 BMI:   Wt Readings from Last 3 Encounters:   02/28/22 178 lb (80.7 kg)   02/02/22 172 lb (78 kg)   01/16/22 170 lb (77.1 kg)     Body mass index is 28.73 kg/m². CBC:   Lab Results   Component Value Date    WBC 10.4 02/28/2022    RBC 3.43 02/28/2022    HGB 10.4 02/28/2022    HCT 30.9 02/28/2022    MCV 90.0 02/28/2022    RDW 12.9 02/28/2022     02/28/2022       CMP:   Lab Results   Component Value Date     01/16/2022    K 3.9 01/16/2022     01/16/2022    CO2 21 01/16/2022    BUN 9 01/16/2022    CREATININE <0.5 01/16/2022    GFRAA >60 01/16/2022    AGRATIO 1.2 01/16/2022    LABGLOM >60 01/16/2022    GLUCOSE 85 01/16/2022    PROT 6.7 01/16/2022    CALCIUM 8.8 01/16/2022    BILITOT <0.2 01/16/2022    ALKPHOS 83 01/16/2022    AST 14 01/16/2022    ALT 7 01/16/2022       POC Tests: No results for input(s): POCGLU, POCNA, POCK, POCCL, POCBUN, POCHEMO, POCHCT in the last 72 hours. Coags: No results found for: PROTIME, INR, APTT    HCG (If Applicable): No results found for: PREGTESTUR, PREGSERUM, HCG, HCGQUANT     ABGs: No results found for: PHART, PO2ART, YWN5WCW, SSO1UBN, BEART, L6VPSVDZ     Type & Screen (If Applicable):  No results found for: LABABO, LABRH    Drug/Infectious Status (If Applicable):  No results found for: HIV, HEPCAB    COVID-19 Screening (If Applicable):   Lab Results   Component Value Date    COVID19 Not Detected 02/22/2022           Anesthesia Evaluation  Patient summary reviewed and Nursing notes reviewed no history of anesthetic complications:   Airway: Mallampati: II     Neck ROM: full   Dental: normal exam         Pulmonary:Negative Pulmonary ROS and normal exam                               Cardiovascular:Negative CV ROS                      Neuro/Psych:   Negative Neuro/Psych ROS  (+) headaches: migraine headaches,             GI/Hepatic/Renal: Neg GI/Hepatic/Renal ROS            Endo/Other: Negative Endo/Other ROS                    Abdominal:             Vascular:           Other Findings:             Anesthesia Plan      epidural     ASA 2 - emergent     (I discussed with the patient the risks and benefits of labor epidural placement. All questions were answered the patient agrees with the plan. Recent Vitals:  --------------------            03/01/22               0143     --------------------   BP:     (!) 103/59   Pulse:      86       Resp:       16       Temp:               --------------------  Body mass index is 28.73 kg/m². Social History    Tobacco Use      Smoking status: Never Smoker      Smokeless tobacco: Never Used      LABS:    CBC  Lab Results       Component                Value               Date/Time                  WBC                      10.4                02/28/2022 08:19 AM        HGB                      10.4 (L)            02/28/2022 08:19 AM        HCT                      30.9 (L)            02/28/2022 08:19 AM        PLT                      218                 02/28/2022 08:19 AM   RENAL  Lab Results       Component                Value               Date/Time                  NA                       133 (L)             01/16/2022 11:55 AM        K                        3.9                 01/16/2022 11:55 AM        CL                       101                 01/16/2022 11:55 AM        CO2                      21                  01/16/2022 11:55 AM        BUN                      9                   01/16/2022 11:55 AM        CREATININE               <0.5 (L)            01/16/2022 11:55 AM        GLUCOSE                  85                  01/16/2022 11:55 AM   COAGS  No results found for: PROTIME, INR, APTT)        Anesthetic plan and risks discussed with patient.                       EDWIN Gomez CRNA   3/1/2022

## 2022-03-01 NOTE — PROGRESS NOTES
Comfortable with epidural.  VSS, afebrile  FHR- 140, mod variability, +accels, no decels  [place]- q 3-5 min, palpate mod  V/e- 4/70/-2 midline AROM small amount clear fluid  Pit @ 8 mu/min  Continue pitocin, position changes, anticipate . Cat 1 FHR tracing.

## 2022-03-01 NOTE — L&D DELIVERY SUMMARY NOTE
Department of Obstetrics and Gynecology  Spontaneous Vaginal Delivery Note    Labor & Delivery Summary  Dilation Complete Date: 22  Dilation Complete Time: 0750    Pre-operative Diagnosis:  Term pregnancy and Induced labor    Post-operative Diagnosis:  Living  infant(s) and Female    Procedure:  Spontaneous vaginal delivery or Repair second degree spontaneous laceration    Surgeon:    Neri Conde for the patient's :  Priscila De La Pazfrancisca Avendano Jannette Proctor [[id number]]      Female Infant, APGAR's 8/9, weight unkown    Anesthesia:  epidural anesthesia    Estimated blood loss:  300ml    Specimen:  Placenta not sent to pathology     Cord blood sent No    Complications:  none    Condition:  infant stble and mother stable    Details of Procedure: The patient is a 28 y.o. female at 44w2d   OB History        2    Para   2    Term   2            AB        Living   2       SAB        IAB        Ectopic        Molar        Multiple   0    Live Births   2             who was admitted for induction. She received the following interventions: pitocin and mcclendon bulb She was known to be GBS negative and did not receive antibiotic prophylaxis. The patient progressed well,did receive an epidural, became complete and started to push. After pushing for 5 min the fetal head was at the perineum, nose and mouth suctioned with bulb suction and the rest of the infant delivered atraumatically, placed on mother abdomen. Cord was clamped and cut and infant handed off to the waiting nurse for evaluation. The delivery of the placenta was spontaneous. The perineum and vagina were explored and a second degree laceration was repaired in standard fashion.
[facility]                            LABOR AND DELIVERY NOTE    PATIENT NAME: Randolph Ashton                  :        1990  MED REC NO:   [id number]                          ROOM:       0320  ACCOUNT NO:   [de-identified]                           ADMIT DATE: 2022  PROVIDER:     Mp Monsalve MD    DATE OF PROCEDURE:  2022    PREOPERATIVE DIAGNOSES:  1. Intrauterine pregnancy at 39 weeks and 2 days. 2.  Induction of labor. POSTOPERATIVE DIAGNOSES:  1. Intrauterine pregnancy at 39 weeks and 2 days. 2.  Induction of labor. PROCEDURE PERFORMED:  Normal spontaneous vaginal delivery with  second-degree laceration. SURGEON:  Mp Monsalve MD    ANESTHESIA:  Epidural.    ESTIMATED BLOOD LOSS:  300 mL. FINDINGS:  Female infant, Apgars of 8 and 9, weight unknown. COMPLICATIONS:  None. HISTORY:  The patient is a 28-year-old  2, para 1-0-0-1, who was  admitted at 39 weeks for induction of labor at term. The patient was  brought into Labor and Delivery and a Ackerman bulb was placed inside her  cervix and Pitocin was begun. She was known to be group B strep  negative. She asked for and did receive epidural anesthesia. She  continued to make change to complete-complete and 0 station. She pushed  for approximately 5 minutes for delivering the above infant. The infant  was bulb suctioned and handed off to parents. Examination of perineum  revealed a second-degree laceration. This was repaired with 3-0 Vicryl  in a normal sterile fashion. Mother and infant were in stable condition  in the recovery period.         Rony Bernabe MD    D: 2022 10:27:02       T: 2022 11:53:53     [email or web address]  Job#: [id number]     Doc#: [id number]    CC:
hard copy, drawn during this pregnancy

## 2022-03-01 NOTE — LACTATION NOTE
This note was copied from a baby's chart. Lactation Progress Note      Data:   RN requesting [facility] assistance with first breast feed after delivery. Mob is a multip who only BF x 2 weeks. Baby had pyloric stenosis and needed surgery. This baby is currently skin to skin and showing feeding cues. Action: Assisted with good position at breast. Mob expressed colostrum to encourage feed. Baby rooting and a good latch achieved after few attempts. Observed SHERWIN with SRS and AS. Breast feeding education initiated. Encouraged to allow baby to go to breast ad ady and stressed importance of always achieving a good deep latch. Offered f/u LC support prn. Discouraged paci, bottles and pumping for the first few weeks. Encouraged good hydration and nutrition. [facility] number on board for f/u prn. Response: Pleased with breast feed. Verbalized and demonstrated understanding. Will call for f/u as needed.

## 2022-03-02 VITALS
RESPIRATION RATE: 18 BRPM | BODY MASS INDEX: 28.61 KG/M2 | WEIGHT: 178 LBS | SYSTOLIC BLOOD PRESSURE: 109 MMHG | HEIGHT: 66 IN | TEMPERATURE: 98.1 F | DIASTOLIC BLOOD PRESSURE: 70 MMHG | HEART RATE: 82 BPM

## 2022-03-02 PROBLEM — Z34.90 ENCOUNTER FOR ELECTIVE INDUCTION OF LABOR: Status: RESOLVED | Noted: 2019-06-19 | Resolved: 2022-03-02

## 2022-03-02 LAB
HCT VFR BLD CALC: 27.1 % (ref 36–48)
HEMOGLOBIN: 9.1 G/DL (ref 12–16)
MCH RBC QN AUTO: 30.5 PG (ref 26–34)
MCHC RBC AUTO-ENTMCNC: 33.5 G/DL (ref 31–36)
MCV RBC AUTO: 91 FL (ref 80–100)
PDW BLD-RTO: 13 % (ref 12.4–15.4)
PLATELET # BLD: 181 K/UL (ref 135–450)
PMV BLD AUTO: 8.8 FL (ref 5–10.5)
RBC # BLD: 2.98 M/UL (ref 4–5.2)
WBC # BLD: 9.2 K/UL (ref 4–11)

## 2022-03-02 PROCEDURE — 6370000000 HC RX 637 (ALT 250 FOR IP): Performed by: OBSTETRICS & GYNECOLOGY

## 2022-03-02 PROCEDURE — 36415 COLL VENOUS BLD VENIPUNCTURE: CPT

## 2022-03-02 PROCEDURE — 2580000003 HC RX 258: Performed by: OBSTETRICS & GYNECOLOGY

## 2022-03-02 PROCEDURE — 85027 COMPLETE CBC AUTOMATED: CPT

## 2022-03-02 RX ADMIN — FERROUS SULFATE TAB 325 MG (65 MG ELEMENTAL FE) 325 MG: 325 (65 FE) TAB at 09:07

## 2022-03-02 RX ADMIN — IBUPROFEN 800 MG: 800 TABLET, FILM COATED ORAL at 02:17

## 2022-03-02 RX ADMIN — Medication 10 ML: at 02:17

## 2022-03-02 RX ADMIN — DOCUSATE SODIUM 100 MG: 100 CAPSULE, LIQUID FILLED ORAL at 09:05

## 2022-03-02 RX ADMIN — IBUPROFEN 800 MG: 800 TABLET, FILM COATED ORAL at 09:05

## 2022-03-02 ASSESSMENT — PAIN SCALES - GENERAL
PAINLEVEL_OUTOF10: 2
PAINLEVEL_OUTOF10: 2

## 2022-03-02 NOTE — DISCHARGE SUMMARY
Obstetrical Discharge Form    Gestational Age: 44w2d    Antepartum complications: Mild gestational iron deficiency anemia    Date of Delivery: 3/2/22      Type of Delivery: vaginal, spontaneous    Delivered By: Tha Burnett     Baby:      Information for the patient's :  Christina Berrios [[id number]]   APGAR One: 8     Information for the patient's :  Christina Rater Romana Dianan [[id number]]   APGAR Five: 9     Information for the patient's :  Christina Rater Romana Eusebia Art [[id number]]   Birth Weight: 7 lb 11.2 oz (3.493 kg)       Anesthesia: Epidural    Intrapartum complications: None    Postpartum complications: anemia    Discharge Medication:      Medication List      ASK your doctor about these medications    amitriptyline 10 MG tablet  Commonly known as: ELAVIL  Take 1 tablet by mouth nightly     ferrous sulfate 325 (65 Fe) MG tablet  Commonly known as: IRON 325  Take 1 tablet by mouth 2 times daily (with meals)     PRENATAL 1 PO            Discharge Condition:  good    Discharge Date: 3/2/22    PLAN:  Follow up in 6 weeks for routine PP visit  Continue FeSO4 at home  All questions answered  D/C summary begun at delivery for D/C planning purposes, any delay in discharge from ordered D/C date due to  factors.

## 2022-03-02 NOTE — LACTATION NOTE
This note was copied from a baby's chart. Lactation Progress Note      Data:   Follow up with multip breast feeder with infant less than 24 hours old. Infant has been sleepy but mother reports several good feeds. Action: Reviewed expectations for the next 24-48 hours including a possible sleepy infant with more frequent feeds tomorrow. Encouraged ad ady feeds with cues or offering expressed drops at least every 2-3 hours with skin to skin. Stressed importance of deep latch with each feed. Mother reports comfortable latch with previous feedings. Response: Encouraged to call for assistance with future feeds. Verbalized understanding of information given.

## 2022-03-02 NOTE — PLAN OF CARE
Problem: VAGINAL DELIVERY - RECOVERY AND POST PARTUM  Goal: Vital signs are medically acceptable  3/1/2022 2317 by Momo Frye RN  Outcome: Ongoing  0/3/2390 1048 by Jammie Watt RN  Outcome: Ongoing  3/1/2022 0930 by Keely Vences RN  Outcome: Ongoing  Goal: Patient will remain free of falls  3/1/2022 2317 by Momo Frye RN  Outcome: Ongoing  4/9/5768 8303 by Jammie Watt RN  Outcome: Ongoing  3/1/2022 0930 by Keely Vences RN  Outcome: Ongoing  Goal: Fundus firm at midline  3/1/2022 2317 by Momo Frye RN  Outcome: Ongoing  3/0/9620 0824 by Jammie Watt RN  Outcome: Ongoing  3/1/2022 0930 by Keely Vences RN  Outcome: Ongoing  Goal: Moderate rubra without clots, no purulent discharge, no foul smelling lochia  3/1/2022 2317 by Momo Frye RN  Outcome: Ongoing  6/4/6349 2909 by Jammie Watt RN  Outcome: Ongoing  3/1/2022 0930 by Keely Vences RN  Outcome: Ongoing  Goal: Empties bladder  3/1/2022 2317 by Momo Frye RN  Outcome: Ongoing  8/1/8542 3639 by Jammie Watt RN  Outcome: Ongoing  3/1/2022 0930 by Keely Vences RN  Outcome: Ongoing  Goal: Verbalizes understanding of normal bowel function resumption  3/1/2022 2317 by Momo Frye RN  Outcome: Ongoing  7/5/6638 3268 by Jammie Watt RN  Outcome: Ongoing  3/1/2022 0930 by Keely Vences RN  Outcome: Ongoing  Goal: Edema will be absent or minimal  3/1/2022 2317 by Momo Frye RN  Outcome: Ongoing  5/5/4835 4950 by Jammie Watt RN  Outcome: Ongoing  3/1/2022 0930 by Keely Vences RN  Outcome: Ongoing  Goal: Breasts are soft with nipple integrity intact  3/1/2022 2317 by Momo Frye RN  Outcome: Ongoing  9/3/2772 4189 by Jammie Watt RN  Outcome: Ongoing  3/1/2022 0930 by Keely Vences RN  Outcome: Ongoing  Goal: Demonstrates appropriate breast feeding techniques  3/1/2022 2317 by Momo Frye RN  Outcome: Ongoing  9/6/8012 9873 by Jammie Watt RN  Outcome: Ongoing  3/1/2022 0930 by Keely Vences, RN  Outcome: Ongoing  Goal: Appropriate behavior observed  3/1/2022 2317 by Heidi Hsu RN  Outcome: Ongoing  1/4/5711 1918 by Hank Kapadia RN  Outcome: Ongoing  3/1/2022 0930 by Doroteo Rojas RN  Outcome: Ongoing  Goal: Positive Mother-Baby interactions are observed  3/1/2022 2317 by Heidi Hsu RN  Outcome: Ongoing  6/0/5489 6633 by Hank Kapadia RN  Outcome: Ongoing  3/1/2022 0930 by Doroteo Rojas RN  Outcome: Ongoing  Goal: Perineum intact without discharge or hematoma  3/1/2022 2317 by Heidi Hsu RN  Outcome: Ongoing  5/7/3945 8301 by Hank Kapadia RN  Outcome: Ongoing  3/1/2022 0930 by Doroteo Rojas RN  Outcome: Ongoing  Goal: Ambulates independently  3/1/2022 2317 by Heidi Hsu RN  Outcome: Ongoing  9/1/1167 7098 by Hank Kapadia RN  Outcome: Ongoing  3/1/2022 0930 by Doroteo Rojas RN  Outcome: Ongoing     Problem: PAIN  Goal: Patient's pain/discomfort is manageable  3/1/2022 2317 by Heidi Hsu RN  Outcome: Ongoing  6/1/7558 5400 by Hank Kapadia RN  Outcome: Ongoing  3/1/2022 0930 by Doroteo Rojas RN  Outcome: Ongoing     Problem: KNOWLEDGE DEFICIT  Goal: Patient/S.O. demonstrates understanding of disease process, treatment plan, medications, and discharge instructions.   3/1/2022 2317 by Heidi Hsu RN  Outcome: Ongoing  3/1/2022 0930 by Doroteo Rojas RN  Outcome: Ongoing

## 2022-03-02 NOTE — PLAN OF CARE
Problem: VAGINAL DELIVERY - RECOVERY AND POST PARTUM  Goal: Vital signs are medically acceptable  4/8/9063 0790 by Delisa Pimentel RN  Outcome: Ongoing  3/1/2022 0930 by Eloise Ponce RN  Outcome: Ongoing  Goal: Patient will remain free of falls  9/1/6975 4963 by Delisa Pimentel RN  Outcome: Ongoing  3/1/2022 0930 by Eloise Ponce RN  Outcome: Ongoing  Goal: Fundus firm at midline  2/5/8991 0018 by Delisa Pimentel RN  Outcome: Ongoing  3/1/2022 0930 by Eloise Ponce RN  Outcome: Ongoing  Goal: Moderate rubra without clots, no purulent discharge, no foul smelling lochia  8/0/2594 8309 by Delisa Pimentel RN  Outcome: Ongoing  3/1/2022 0930 by Eloise Ponce RN  Outcome: Ongoing  Goal: Empties bladder  2/4/9294 5508 by Delisa Pimentel RN  Outcome: Ongoing  3/1/2022 0930 by Eloise Ponce RN  Outcome: Ongoing  Goal: Verbalizes understanding of normal bowel function resumption  9/0/4630 8440 by Delisa Pimentel RN  Outcome: Ongoing  3/1/2022 0930 by Eloise Ponce RN  Outcome: Ongoing  Goal: Edema will be absent or minimal  6/3/3324 7637 by Delisa Pimentel RN  Outcome: Ongoing  3/1/2022 0930 by Eloise Ponce RN  Outcome: Ongoing  Goal: Breasts are soft with nipple integrity intact  9/1/7366 8570 by Delisa Pimentel RN  Outcome: Ongoing  3/1/2022 0930 by Eloise Ponce RN  Outcome: Ongoing  Goal: Demonstrates appropriate breast feeding techniques  5/0/1286 9139 by Delisa Pimentel RN  Outcome: Ongoing  3/1/2022 0930 by Eloise Ponce RN  Outcome: Ongoing  Goal: Appropriate behavior observed  1/1/9126 5410 by Delisa Pimentel RN  Outcome: Ongoing  3/1/2022 0930 by Eloise Ponce RN  Outcome: Ongoing  Goal: Positive Mother-Baby interactions are observed  0/1/7518 4050 by Delisa Pimentel RN  Outcome: Ongoing  3/1/2022 0930 by Eloise Ponce RN  Outcome: Ongoing  Goal: Perineum intact without discharge or hematoma  1/4/9132 2698 by Delisa Pimentel RN  Outcome: Ongoing  3/1/2022 7079 by Roderick Sullivan RN  Outcome: Ongoing  Goal: Ambulates independently  1/8/9643 9821 by Chelle Baird RN  Outcome: Ongoing  3/1/2022 0930 by Roderick Sullivan RN  Outcome: Ongoing     Problem: PAIN  Goal: Patient's pain/discomfort is manageable  2/9/8197 1390 by Chelle Baird RN  Outcome: Ongoing  3/1/2022 0930 by Roderick Sullivan RN  Outcome: Ongoing     Problem: KNOWLEDGE DEFICIT  Goal: Patient/S.O. demonstrates understanding of disease process, treatment plan, medications, and discharge instructions.   3/1/2022 0930 by Roderick Sullivan RN  Outcome: Ongoing

## 2022-03-02 NOTE — ANESTHESIA POSTPROCEDURE EVALUATION
Department of Anesthesiology  Postprocedure Note    Patient: Shira Patel  MRN: [id number]  YOB: 1990  Date of evaluation: 3/2/2022  Time:  12:42 PM     Procedure Summary     Date: 03/01/22 Room / Location:     Anesthesia Start: 0345 Anesthesia Stop: 9947    Procedure: Labor Analgesia Diagnosis:     Scheduled Providers:  Responsible Provider: Yessenia Love MD    Anesthesia Type: epidural ASA Status: 2 - Emergent          Anesthesia Type: epidural    Art Phase I: Art Score: 9    Art Phase II: Art Score: 9    Last vitals: Reviewed and per EMR flowsheets. Anesthesia Post Evaluation    Level of consciousness: awake  Complications: no  Cardiovascular status: hemodynamically stable  Respiratory status: acceptable  Comments: No apparent complications from neuraxial anesthesia.

## 2022-03-02 NOTE — PROGRESS NOTES
Department of Obstetrics and Gynecology  Labor and Delivery  Attending Post Partum Progress Note      SUBJECTIVE: PPD 1 s/p . Denies c/o. Tolerating regular diet. Voiding and ambulating w/out difficulty. Bleeding=menses. Pain well controlled w/ ibuprofen. Breastfeeding. Desires d/c home today.      OBJECTIVE:      Vitals:  /67   Pulse 88   Temp 97.8 °F (36.6 °C) (Oral)   Resp 18   Ht 5' 6\" (1.676 m)   Wt 178 lb (80.7 kg)   Breastfeeding Unknown   BMI 28.73 kg/m²     ABDOMEN: FF @ U/1  GENITAL/URINARY: well approximated    DATA:    CBC:    Lab Results   Component Value Date    WBC 9.2 2022    RBC 2.98 2022    HGB 9.1 2022    HCT 27.1 2022    MCV 91.0 2022    RDW 13.0 2022     2022       ASSESSMENT & PLAN:      PPD 1 s/p   Meeting PP milestones  Anemia - continue FeSO4 at home  Stable, breastfeeding female infant  D/C home today  RTO 6 wks or PRN

## 2022-03-02 NOTE — PROGRESS NOTES
Discharge Phone Call    Patient Name: Zaria Hermosillo Care Provider: Tabby Hope MD Discharge Date: 3/2/2022    Disposition of baby:    Phone Number: [phone] (home)     Attempts to Contact:  Date:    Caller  Date:    Caller  Date:    Caller    Information for the patient's :  Richar Ho [[id number]]   Delivery Method: Vaginal, Spontaneous       1. Now that you are at home is your pain being well controlled? Y/N   If no, instruct to call       provider. 2. Are you breastfeeding? Y/N    Do you need any extra support from our lactation staff? Y/N    If yes, provide number for lactation. 3. Have you made or already had your first appointment with the baby's doctor? Y/N   If no, do      you know when to schedule it? Y/N    4. Have you scheduled your follow-up appointment? Y/N  If no, do you know when to schedule       it? Y/N   If no, they can find it on printed discharge instructions. 5. Did staff discuss safe sleep during your stay? Y/N   6. Did we explain things in a way you could understand? Y/N  7. Were we respectful of your preferences for labor and birth and include you in the plan of       care? Y/N  If no, please explain _______________________________________________  8. Is there anyone in particular you would like to mention who provided care for you? _______      _________________________________________________________________________     9. Were you given a Post-Birth Warning Signs handout? Y/N  Do you have it somewhere      easily accessible? Y/N  If no, please send them a copy and ask them to put it somewhere      easily found. 10. Have you been crying excessively, having anger or mood swings that feel out of control, or       feel like you can't cope with caring for yourself or baby? Y/N   If yes, they may be showing       signs of postpartum depression and should call provider.  There is also a        depression test on page C5 in their discharge booklet they can take. 13. Do you have any other questions or concerns I can address today?  Y/N  ______________      _________________________________________________________________________    Information provided during call :_________________________________________________  ___________________________________________________________________________    Call completed by:____________________________    Date:_________ Time:___________

## 2022-03-02 NOTE — LACTATION NOTE
This note was copied from a baby's chart. Lactation Progress Note      Data:   F/U on multip who breast fed first child briefly. Mob states that this baby is feeding well and cluster fed over night. States that latch is comfortable. Parents are hoping to be d/c home today. Output and wt loss are WNL. Action: Discharge teaching done; what to expect in the first few days of life, to feed baby at first sign of hunger cue for total of 8-12 times per day after the first DOL, how to properly position and latch baby, how to know baby is getting enough, engorgement prevention and treatment, avoiding bottles and pacifiers, community resources, pumping and return to work. Encouraged to call [de-identified] or Outpatient [facility] clinic for f/u prn. Response: Verbalized understanding and comfortable with breast feeding for d/c.

## 2022-05-23 ENCOUNTER — OFFICE VISIT (OUTPATIENT)
Dept: FAMILY MEDICINE CLINIC | Age: 32
End: 2022-05-23
Payer: COMMERCIAL

## 2022-05-23 VITALS
WEIGHT: 160 LBS | OXYGEN SATURATION: 99 % | HEIGHT: 66 IN | HEART RATE: 84 BPM | BODY MASS INDEX: 25.71 KG/M2 | DIASTOLIC BLOOD PRESSURE: 70 MMHG | SYSTOLIC BLOOD PRESSURE: 108 MMHG

## 2022-05-23 DIAGNOSIS — Z00.00 PHYSICAL EXAM: Primary | ICD-10-CM

## 2022-05-23 PROCEDURE — 86580 TB INTRADERMAL TEST: CPT | Performed by: NURSE PRACTITIONER

## 2022-05-23 PROCEDURE — 99395 PREV VISIT EST AGE 18-39: CPT | Performed by: NURSE PRACTITIONER

## 2022-05-23 ASSESSMENT — ENCOUNTER SYMPTOMS
DIARRHEA: 0
SORE THROAT: 0
SHORTNESS OF BREATH: 0
WHEEZING: 0
CONSTIPATION: 0
COUGH: 1
ABDOMINAL PAIN: 0

## 2022-05-23 ASSESSMENT — PATIENT HEALTH QUESTIONNAIRE - PHQ9
1. LITTLE INTEREST OR PLEASURE IN DOING THINGS: 1
2. FEELING DOWN, DEPRESSED OR HOPELESS: 0
SUM OF ALL RESPONSES TO PHQ9 QUESTIONS 1 & 2: 1
SUM OF ALL RESPONSES TO PHQ QUESTIONS 1-9: 1

## 2022-05-23 NOTE — PROGRESS NOTES
Patient ID: Ivanna Bonner is a 28 y.o. female who presents today for a Physical Exam.      HPI  Here for physical exam  Going to school for respiratory therapy   Needs TB test 2 step  She will fill out records release for vaccines- if we don't get record in time will draw titers for MMRV and Hep B     Past Medical History:   Diagnosis Date    Anemia     Taking iron with pregnancy    H. pylori infection 2018    Migraine     has aura       No past surgical history on file. Family History   Problem Relation Age of Onset    High Cholesterol Mother     Cancer Maternal Grandmother 46        lung    Diabetes Maternal Aunt     COPD Maternal Aunt     Seizures Maternal Aunt         epilepsy          Social History     Socioeconomic History    Marital status: Single     Spouse name: None    Number of children: None    Years of education: None    Highest education level: None   Occupational History    None   Tobacco Use    Smoking status: Never Smoker    Smokeless tobacco: Never Used   Vaping Use    Vaping Use: Never used   Substance and Sexual Activity    Alcohol use: Not Currently     Alcohol/week: 0.0 standard drinks    Drug use: No    Sexual activity: Yes     Partners: Male   Other Topics Concern    None   Social History Narrative    None     Social Determinants of Health     Financial Resource Strain:     Difficulty of Paying Living Expenses: Not on file   Food Insecurity:     Worried About Running Out of Food in the Last Year: Not on file    Syl of Food in the Last Year: Not on file   Transportation Needs:     Lack of Transportation (Medical): Not on file    Lack of Transportation (Non-Medical):  Not on file   Physical Activity:     Days of Exercise per Week: Not on file    Minutes of Exercise per Session: Not on file   Stress:     Feeling of Stress : Not on file   Social Connections:     Frequency of Communication with Friends and Family: Not on file    Frequency of Social Gatherings with Friends and Family: Not on file    Attends Yazidism Services: Not on file    Active Member of Clubs or Organizations: Not on file    Attends Club or Organization Meetings: Not on file    Marital Status: Not on file   Intimate Partner Violence:     Fear of Current or Ex-Partner: Not on file    Emotionally Abused: Not on file    Physically Abused: Not on file    Sexually Abused: Not on file   Housing Stability:     Unable to Pay for Housing in the Last Year: Not on file    Number of [place] in the Last Year: Not on file    Unstable Housing in the Last Year: Not on file       No Known Allergies    Current Outpatient Medications   Medication Sig Dispense Refill    ferrous sulfate 325 (65 Fe) MG tablet Take 1 tablet by mouth 2 times daily (with meals) 30 tablet 3     No current facility-administered medications for this visit. The patient's past medical history, past surgical history, family history, medications, and allergies were all reviewed and updated at appropriate today. Review of Systems   Constitutional: Negative for chills and fever. HENT: Negative for congestion and sore throat. Eyes: Negative for visual disturbance. Respiratory: Positive for cough (with mucous in the mornings- since she had COVID in 2020). Negative for shortness of breath and wheezing. Cardiovascular: Negative for chest pain and palpitations. Gastrointestinal: Negative for abdominal pain, constipation and diarrhea. Genitourinary: Negative. Musculoskeletal: Negative. Skin: Negative. Neurological: Positive for headaches (migraines- tried propranolol or imitrex- gets them about 1-3 a month takes tylenol or excedrin ). Negative for dizziness. Hematological: Does not bruise/bleed easily. Psychiatric/Behavioral: Negative. Physical Exam  Vitals and nursing note reviewed. Constitutional:       Appearance: Normal appearance. She is well-developed.    HENT:      Head: Normocephalic and atraumatic. Right Ear: Tympanic membrane and external ear normal.      Left Ear: Tympanic membrane and external ear normal.      Nose: Nose normal.      Mouth/Throat:      Pharynx: No oropharyngeal exudate or posterior oropharyngeal erythema. Eyes:      Conjunctiva/sclera: Conjunctivae normal.   Cardiovascular:      Rate and Rhythm: Normal rate and regular rhythm. Heart sounds: Normal heart sounds. No murmur heard. Pulmonary:      Effort: Pulmonary effort is normal. No respiratory distress. Breath sounds: Normal breath sounds. No wheezing or rales. Abdominal:      General: Bowel sounds are normal. There is no distension. Palpations: Abdomen is soft. Tenderness: There is no abdominal tenderness. There is no rebound. Musculoskeletal:         General: Normal range of motion. Cervical back: Normal range of motion and neck supple. Lymphadenopathy:      Cervical: No cervical adenopathy. Skin:     General: Skin is warm and dry. Neurological:      General: No focal deficit present. Mental Status: She is alert and oriented to person, place, and time. Deep Tendon Reflexes: Reflexes are normal and symmetric. Psychiatric:         Mood and Affect: Mood normal.         Behavior: Behavior normal.         Thought Content: Thought content normal.         Judgment: Judgment normal.         Assessment:  Encounter Diagnosis   Name Primary?  Physical exam Yes       Plan:  1. Physical exam              No follow-ups on file.

## 2022-05-25 ENCOUNTER — NURSE ONLY (OUTPATIENT)
Dept: FAMILY MEDICINE CLINIC | Age: 32
End: 2022-05-25

## 2022-05-25 LAB
INDURATION: 0
TB SKIN TEST: NEGATIVE

## 2022-05-31 ENCOUNTER — TELEMEDICINE (OUTPATIENT)
Dept: FAMILY MEDICINE CLINIC | Age: 32
End: 2022-05-31
Payer: COMMERCIAL

## 2022-05-31 DIAGNOSIS — R53.83 FATIGUE, UNSPECIFIED TYPE: ICD-10-CM

## 2022-05-31 DIAGNOSIS — R06.02 SOB (SHORTNESS OF BREATH): Primary | ICD-10-CM

## 2022-05-31 DIAGNOSIS — R51.9 ACUTE NONINTRACTABLE HEADACHE, UNSPECIFIED HEADACHE TYPE: ICD-10-CM

## 2022-05-31 LAB
INFLUENZA A ANTIBODY: NEGATIVE
INFLUENZA B ANTIBODY: NEGATIVE

## 2022-05-31 PROCEDURE — 1036F TOBACCO NON-USER: CPT | Performed by: NURSE PRACTITIONER

## 2022-05-31 PROCEDURE — 87804 INFLUENZA ASSAY W/OPTIC: CPT | Performed by: NURSE PRACTITIONER

## 2022-05-31 PROCEDURE — G8427 DOCREV CUR MEDS BY ELIG CLIN: HCPCS | Performed by: NURSE PRACTITIONER

## 2022-05-31 PROCEDURE — 86580 TB INTRADERMAL TEST: CPT | Performed by: NURSE PRACTITIONER

## 2022-05-31 PROCEDURE — G8419 CALC BMI OUT NRM PARAM NOF/U: HCPCS | Performed by: NURSE PRACTITIONER

## 2022-05-31 PROCEDURE — 99213 OFFICE O/P EST LOW 20 MIN: CPT | Performed by: NURSE PRACTITIONER

## 2022-05-31 RX ORDER — ALBUTEROL SULFATE 90 UG/1
2 AEROSOL, METERED RESPIRATORY (INHALATION) 4 TIMES DAILY PRN
Qty: 54 G | Refills: 1 | Status: SHIPPED | OUTPATIENT
Start: 2022-05-31 | End: 2022-11-01 | Stop reason: ALTCHOICE

## 2022-05-31 ASSESSMENT — ENCOUNTER SYMPTOMS
WHEEZING: 0
SHORTNESS OF BREATH: 1
CHEST TIGHTNESS: 1
SORE THROAT: 0
COUGH: 0

## 2022-05-31 NOTE — PROGRESS NOTES
2022    TELEHEALTH EVALUATION -- Audio/Visual (During [id number] public health emergency)    HPI:    Daysi Gann (:  1990) has requested an audio/video evaluation for the following concern(s):    Yesterday morning noticed she felt SOB, pressure headache- since then feels worse, SOB, fatigue- pulse ox at fire dept- was 92-93%. HR going up when she gets up- COVID at home tests were negative. Had Ovidio 2 years ago. No wheezing. Has been taking dayquil and tylenol. Tried home nebulizer and it did not help her SOB. Review of Systems   Constitutional: Positive for fatigue. Negative for fever. HENT: Negative for congestion and sore throat. Respiratory: Positive for chest tightness and shortness of breath. Negative for cough and wheezing. Cardiovascular: Negative for chest pain and palpitations. Neurological: Positive for headaches. Prior to Visit Medications    Medication Sig Taking?  Authorizing Provider   albuterol sulfate HFA (VENTOLIN HFA) 108 (90 Base) MCG/ACT inhaler Inhale 2 puffs into the lungs 4 times daily as needed for Wheezing Yes EDWIN Hameed - CNP   ferrous sulfate 325 (65 Fe) MG tablet Take 1 tablet by mouth 2 times daily (with meals) Yes EDWIN Koch CNM       Social History     Tobacco Use    Smoking status: Never Smoker    Smokeless tobacco: Never Used   Vaping Use    Vaping Use: Never used   Substance Use Topics    Alcohol use: Not Currently     Alcohol/week: 0.0 standard drinks    Drug use: No        No Known Allergies,   Past Medical History:   Diagnosis Date    Anemia     Taking iron with pregnancy    H. pylori infection     Migraine     has aura   , No past surgical history on file.,   Family History   Problem Relation Age of Onset    High Cholesterol Mother     Cancer Maternal Grandmother 46        lung    Diabetes Maternal Aunt     COPD Maternal Aunt     Seizures Maternal Aunt         epilepsy       PHYSICAL EXAMINATION:  [ INSTRUCTIONS:  \"[x]\" Indicates a positive item  \"[]\" Indicates a negative item  -- DELETE ALL ITEMS NOT EXAMINED]  Vital Signs: (As obtained by patient/caregiver or practitioner observation)    Blood pressure-  Heart rate-    Respiratory rate-    Temperature-  Pulse oximetry-     Constitutional: [x] Appears well-developed and well-nourished [x] No apparent distress      [] Abnormal-   Mental status  [] Alert and awake  [x] Oriented to person/place/time [x]Able to follow commands      Eyes:  EOM    []  Normal  [] Abnormal-  Sclera  [x]  Normal  [] Abnormal -         Discharge []  None visible  [] Abnormal -    HENT:   [x] Normocephalic, atraumatic. [] Abnormal   [] Mouth/Throat: Mucous membranes are moist.     External Ears [] Normal  [] Abnormal-     Neck: [] No visualized mass     Pulmonary/Chest: [x] Respiratory effort normal.  [x] No visualized signs of difficulty breathing or respiratory distress        [] Abnormal-      Musculoskeletal:   [] Normal gait with no signs of ataxia         [x] Normal range of motion of neck        [] Abnormal-       Neurological:        [x] No Facial Asymmetry (Cranial nerve 7 motor function) (limited exam to video visit)          [x] No gaze palsy        [] Abnormal-         Skin:        [x] No significant exanthematous lesions or discoloration noted on facial skin         [] Abnormal-            Psychiatric:       [x] Normal Affect [x] No Hallucinations        [] Abnormal-     Other pertinent observable physical exam findings-     ASSESSMENT/PLAN:  1. SOB (shortness of breath)    - POCT Influenza A/B  - COVID-19; Future    2. Fatigue, unspecified type    - POCT Influenza A/B  - COVID-19; Future    3. Acute nonintractable headache, unspecified headache type    - POCT Influenza A/B  - COVID-19; Future      No follow-ups on file. Noe Barahona, was evaluated through a synchronous (real-time) audio-video encounter.  The patient (or guardian if applicable) is aware that this is a billable service, which includes applicable co-pays. This Virtual Visit was conducted with patient's (and/or legal guardian's) consent. The visit was conducted pursuant to the emergency declaration under the [address] [place] [address], [address] [place] [address] authority and the [employer] and [employer] General Act. Patient identification was verified, and a caregiver was present when appropriate. The patient was located at Home: [address] [address]. Provider was located at [facility] (Appt Dept): [address],  [address]. Total time spent on this encounter: Not billed by time    --EDWIN Hameed CNP on 5/31/2022 at 9:58 AM    An electronic signature was used to authenticate this note.

## 2022-06-01 LAB — SARS-COV-2: NOT DETECTED

## 2022-06-02 ENCOUNTER — TELEPHONE (OUTPATIENT)
Dept: FAMILY MEDICINE CLINIC | Age: 32
End: 2022-06-02

## 2022-06-02 ENCOUNTER — NURSE ONLY (OUTPATIENT)
Dept: FAMILY MEDICINE CLINIC | Age: 32
End: 2022-06-02

## 2022-06-02 DIAGNOSIS — Z11.59 SCREENING FOR VIRAL DISEASE: Primary | ICD-10-CM

## 2022-06-02 LAB
INDURATION: 0
TB SKIN TEST: NEGATIVE

## 2022-06-27 ENCOUNTER — TELEPHONE (OUTPATIENT)
Dept: FAMILY MEDICINE CLINIC | Age: 32
End: 2022-06-27

## 2022-06-27 NOTE — TELEPHONE ENCOUNTER
-Pt requesting Advise Call Back about Needing the Varicella Vaccine. Pt is needing this for work. -Pt states her Immunizations records doesn't show having this vaccine, but Pt states she does remember getting chickenpox as a child.   [phone]

## 2022-06-29 NOTE — TELEPHONE ENCOUNTER
Contacted pt and she informed she has had vaccine but needs proof. Upon looking her records was located and she did in fact have the vaccine on 12/22/02.

## 2022-06-29 NOTE — TELEPHONE ENCOUNTER
A history of having chicken pox usually suffices, but if she needs proof she had it, she can come in for a varicella zoster IgG.

## 2022-11-01 ENCOUNTER — TELEMEDICINE (OUTPATIENT)
Dept: FAMILY MEDICINE CLINIC | Age: 32
End: 2022-11-01
Payer: COMMERCIAL

## 2022-11-01 DIAGNOSIS — R05.9 COUGH, UNSPECIFIED TYPE: Primary | ICD-10-CM

## 2022-11-01 DIAGNOSIS — J06.9 VIRAL URI: ICD-10-CM

## 2022-11-01 PROCEDURE — 99213 OFFICE O/P EST LOW 20 MIN: CPT | Performed by: NURSE PRACTITIONER

## 2022-11-01 PROCEDURE — G8484 FLU IMMUNIZE NO ADMIN: HCPCS | Performed by: NURSE PRACTITIONER

## 2022-11-01 PROCEDURE — G8419 CALC BMI OUT NRM PARAM NOF/U: HCPCS | Performed by: NURSE PRACTITIONER

## 2022-11-01 PROCEDURE — 1036F TOBACCO NON-USER: CPT | Performed by: NURSE PRACTITIONER

## 2022-11-01 PROCEDURE — G8427 DOCREV CUR MEDS BY ELIG CLIN: HCPCS | Performed by: NURSE PRACTITIONER

## 2022-11-01 RX ORDER — BENZONATATE 100 MG/1
100-200 CAPSULE ORAL 3 TIMES DAILY PRN
Qty: 30 CAPSULE | Refills: 0 | Status: SHIPPED | OUTPATIENT
Start: 2022-11-01

## 2022-11-01 RX ORDER — LEVONORGESTREL 52 MG/1
INTRAUTERINE DEVICE INTRAUTERINE
COMMUNITY
Start: 2022-07-06

## 2022-11-01 ASSESSMENT — ENCOUNTER SYMPTOMS
SINUS PRESSURE: 1
VOICE CHANGE: 1
WHEEZING: 0
COUGH: 1
SINUS PAIN: 1
SORE THROAT: 0
SHORTNESS OF BREATH: 1

## 2022-11-01 NOTE — PROGRESS NOTES
2022    TELEHEALTH EVALUATION -- Audio/Visual (During SWKOH-07 public health emergency)    HPI:    Herlinda Munguia (:  1990) has requested an audio/video evaluation for the following concern(s):    Dry cough- started last weekend with sore throat, productive cough,sneezing, now dry cough and voice is hoarse. Kids were sick last week but are better now. Has tried nebulizer at home- did not help much, dayquil, sudafed and cough drops. No fever. Review of Systems   Constitutional:  Positive for fatigue. Negative for chills and fever. HENT:  Positive for sinus pressure, sinus pain and voice change (raspy voice). Negative for sore throat. Respiratory:  Positive for cough (dry) and shortness of breath (when walking even gets SOB). Negative for wheezing. Musculoskeletal:  Positive for myalgias. Neurological:  Positive for headaches. Prior to Visit Medications    Medication Sig Taking? Authorizing Provider   levonorgestrel (MIRENA, 52 MG,) IUD 52 mg place 1 device by intrauterine device Yes Historical Provider, MD   benzonatate (TESSALON) 100 MG capsule Take 1-2 capsules by mouth 3 times daily as needed for Cough Yes EDWIN Méndez - CNP   ferrous sulfate 325 (65 Fe) MG tablet Take 1 tablet by mouth 2 times daily (with meals)  Melvenia Hatchet, APRN - CNM       Social History     Tobacco Use    Smoking status: Never    Smokeless tobacco: Never   Vaping Use    Vaping Use: Never used   Substance Use Topics    Alcohol use: Not Currently     Alcohol/week: 0.0 standard drinks    Drug use: No        No Known Allergies,   Past Medical History:   Diagnosis Date    Anemia     Taking iron with pregnancy    H. pylori infection 2018    Migraine     has aura   , History reviewed. No pertinent surgical history. ,   Social History     Tobacco Use    Smoking status: Never    Smokeless tobacco: Never   Vaping Use    Vaping Use: Never used   Substance Use Topics    Alcohol use: Not Currently Alcohol/week: 0.0 standard drinks    Drug use: No   ,   Family History   Problem Relation Age of Onset    High Cholesterol Mother     Cancer Maternal Grandmother 46        lung    Diabetes Maternal Aunt     COPD Maternal Aunt     Seizures Maternal Aunt         epilepsy       PHYSICAL EXAMINATION:  [ INSTRUCTIONS:  \"[x]\" Indicates a positive item  \"[]\" Indicates a negative item  -- DELETE ALL ITEMS NOT EXAMINED]  Vital Signs: (As obtained by patient/caregiver or practitioner observation)    Blood pressure-  Heart rate-    Respiratory rate-    Temperature-  Pulse oximetry-     Constitutional: [x] Appears well-developed and well-nourished [x] No apparent distress      [] Abnormal-   Mental status  [x] Alert and awake  [x] Oriented to person/place/time [x]Able to follow commands      Eyes:  EOM    []  Normal  [] Abnormal-  Sclera  [x]  Normal  [] Abnormal -         Discharge []  None visible  [] Abnormal -    HENT:   [x] Normocephalic, atraumatic. [x] Abnormal voice sounds hoarse  [] Mouth/Throat: Mucous membranes are moist.     External Ears [] Normal  [] Abnormal-     Neck: [] No visualized mass     Pulmonary/Chest: [x] Respiratory effort normal.  [x] No visualized signs of difficulty breathing or respiratory distress        [] Abnormal-      Musculoskeletal:   [] Normal gait with no signs of ataxia         [x] Normal range of motion of neck        [] Abnormal-       Neurological:        [x] No Facial Asymmetry (Cranial nerve 7 motor function) (limited exam to video visit)          [x] No gaze palsy        [] Abnormal-         Skin:        [x] No significant exanthematous lesions or discoloration noted on facial skin         [] Abnormal-            Psychiatric:       [x] Normal Affect [x] No Hallucinations        [] Abnormal-     Other pertinent observable physical exam findings-     ASSESSMENT/PLAN:  1. Cough, unspecified type    - benzonatate (TESSALON) 100 MG capsule;  Take 1-2 capsules by mouth 3 times daily as needed for Cough  Dispense: 30 capsule; Refill: 0    2. Viral URI  Ok to continue meds for symptoms  Likely viral illness  Discussed virus normally last a week to 10 days and then should be better  Discussed to watch for any yellow mucous from coughing or from her nose- that would be sign of infection       No follow-ups on file. Francisco Nelson, was evaluated through a synchronous (real-time) audio-video encounter. The patient (or guardian if applicable) is aware that this is a billable service, which includes applicable co-pays. This Virtual Visit was conducted with patient's (and/or legal guardian's) consent. The visit was conducted pursuant to the emergency declaration under the [address] [place] [address], [address] [place] [address] authority and the [employer] Act. Patient identification was verified, and a caregiver was present when appropriate. The patient was located at Home: [address] [place] [address]. Provider was located at [facility] (App[facility] Dept[facility] Total time spent on this encounter: Not billed by time    --EDWIN Stuart CNP on 11/1/2022 at 2:14 PM    An electronic signature was used to authenticate this note.

## 2023-05-02 ENCOUNTER — OFFICE VISIT (OUTPATIENT)
Dept: FAMILY MEDICINE CLINIC | Age: 33
End: 2023-05-02
Payer: COMMERCIAL

## 2023-05-02 VITALS
OXYGEN SATURATION: 98 % | DIASTOLIC BLOOD PRESSURE: 80 MMHG | BODY MASS INDEX: 23.57 KG/M2 | SYSTOLIC BLOOD PRESSURE: 118 MMHG | WEIGHT: 146 LBS | HEART RATE: 84 BPM

## 2023-05-02 DIAGNOSIS — R11.0 NAUSEA: ICD-10-CM

## 2023-05-02 DIAGNOSIS — E61.1 IRON DEFICIENCY: ICD-10-CM

## 2023-05-02 DIAGNOSIS — G43.109 MIGRAINE WITH AURA AND WITHOUT STATUS MIGRAINOSUS, NOT INTRACTABLE: Primary | ICD-10-CM

## 2023-05-02 DIAGNOSIS — G43.109 MIGRAINE WITH AURA AND WITHOUT STATUS MIGRAINOSUS, NOT INTRACTABLE: ICD-10-CM

## 2023-05-02 PROCEDURE — G8427 DOCREV CUR MEDS BY ELIG CLIN: HCPCS | Performed by: NURSE PRACTITIONER

## 2023-05-02 PROCEDURE — 96372 THER/PROPH/DIAG INJ SC/IM: CPT | Performed by: NURSE PRACTITIONER

## 2023-05-02 PROCEDURE — 99214 OFFICE O/P EST MOD 30 MIN: CPT | Performed by: NURSE PRACTITIONER

## 2023-05-02 PROCEDURE — 1036F TOBACCO NON-USER: CPT | Performed by: NURSE PRACTITIONER

## 2023-05-02 PROCEDURE — G8420 CALC BMI NORM PARAMETERS: HCPCS | Performed by: NURSE PRACTITIONER

## 2023-05-02 RX ORDER — ONDANSETRON 4 MG/1
4 TABLET, ORALLY DISINTEGRATING ORAL 3 TIMES DAILY PRN
Qty: 21 TABLET | Refills: 3 | Status: SHIPPED | OUTPATIENT
Start: 2023-05-02

## 2023-05-02 RX ORDER — KETOROLAC TROMETHAMINE 30 MG/ML
60 INJECTION, SOLUTION INTRAMUSCULAR; INTRAVENOUS ONCE
Status: COMPLETED | OUTPATIENT
Start: 2023-05-02 | End: 2023-05-02

## 2023-05-02 RX ORDER — SUMATRIPTAN 50 MG/1
50 TABLET, FILM COATED ORAL
Qty: 9 TABLET | Refills: 3 | Status: SHIPPED | OUTPATIENT
Start: 2023-05-02 | End: 2023-05-02

## 2023-05-02 RX ADMIN — KETOROLAC TROMETHAMINE 60 MG: 30 INJECTION, SOLUTION INTRAMUSCULAR; INTRAVENOUS at 13:58

## 2023-05-02 ASSESSMENT — ENCOUNTER SYMPTOMS
SHORTNESS OF BREATH: 0
CHEST TIGHTNESS: 1
NAUSEA: 1

## 2023-05-02 ASSESSMENT — PATIENT HEALTH QUESTIONNAIRE - PHQ9
SUM OF ALL RESPONSES TO PHQ QUESTIONS 1-9: 0
1. LITTLE INTEREST OR PLEASURE IN DOING THINGS: 0
SUM OF ALL RESPONSES TO PHQ QUESTIONS 1-9: 0
SUM OF ALL RESPONSES TO PHQ9 QUESTIONS 1 & 2: 0
2. FEELING DOWN, DEPRESSED OR HOPELESS: 0
SUM OF ALL RESPONSES TO PHQ QUESTIONS 1-9: 0
SUM OF ALL RESPONSES TO PHQ QUESTIONS 1-9: 0

## 2023-05-02 NOTE — PROGRESS NOTES
Patient: Matti Coombs is a 33 y.o. female who presents today with the following Chief Complaint(s):  Chief Complaint   Patient presents with    Headache     On and off, nausea, becoming more consistent, had before kids but now worse          HPI  Here to discuss her headaches  Headaches- tried imitrex, propranolol and amitriptyline in the past.  Headaches every other day now- migraine a few times a month where she needs to lay down-due to auras that affect her eyes- has light sensitivity and nausea. Has tried chiropractor, glasses, Excedrin, tylenol. Had headaches before her kids but seem to be worse now  Had normal CT of head in 2018    Iron def: takes iron supplement once daily    Current Outpatient Medications   Medication Sig Dispense Refill    Rimegepant Sulfate 75 MG TBDP Take 75 mg by mouth every other day 15 tablet 1    SUMAtriptan (IMITREX) 50 MG tablet Take 1 tablet by mouth once as needed for Migraine 9 tablet 3    ondansetron (ZOFRAN-ODT) 4 MG disintegrating tablet Take 1 tablet by mouth 3 times daily as needed for Nausea or Vomiting 21 tablet 3    levonorgestrel (MIRENA, 52 MG,) IUD 52 mg place 1 device by intrauterine device      ferrous sulfate 325 (65 Fe) MG tablet Take 1 tablet by mouth 2 times daily (with meals) 30 tablet 3     No current facility-administered medications for this visit. Patient's past medical history, surgical history, family history, medications,  andallergies  were all reviewed and updated as appropriate today. Review of Systems   Constitutional:  Negative for chills and fever. Respiratory:  Positive for chest tightness (with anxiety when she gets the headache). Negative for shortness of breath. Cardiovascular:  Negative for chest pain and palpitations. Gastrointestinal:  Positive for nausea (with the headaches). Neurological:  Positive for headaches. Physical Exam  Vitals and nursing note reviewed.    Constitutional:       Appearance: Normal

## 2023-05-03 LAB
ALBUMIN SERPL-MCNC: 4.8 G/DL (ref 3.4–5)
ALBUMIN/GLOB SERPL: 1.8 {RATIO} (ref 1.1–2.2)
ALP SERPL-CCNC: 36 U/L (ref 40–129)
ALT SERPL-CCNC: 11 U/L (ref 10–40)
ANION GAP SERPL CALCULATED.3IONS-SCNC: 12 MMOL/L (ref 3–16)
AST SERPL-CCNC: 16 U/L (ref 15–37)
BASOPHILS # BLD: 0 K/UL (ref 0–0.2)
BASOPHILS NFR BLD: 0.5 %
BILIRUB SERPL-MCNC: <0.2 MG/DL (ref 0–1)
BUN SERPL-MCNC: 18 MG/DL (ref 7–20)
CALCIUM SERPL-MCNC: 9.7 MG/DL (ref 8.3–10.6)
CHLORIDE SERPL-SCNC: 103 MMOL/L (ref 99–110)
CO2 SERPL-SCNC: 25 MMOL/L (ref 21–32)
CREAT SERPL-MCNC: 0.8 MG/DL (ref 0.6–1.1)
DEPRECATED RDW RBC AUTO: 12.3 % (ref 12.4–15.4)
EOSINOPHIL # BLD: 0.1 K/UL (ref 0–0.6)
EOSINOPHIL NFR BLD: 0.8 %
FERRITIN SERPL IA-MCNC: 39.7 NG/ML (ref 15–150)
GFR SERPLBLD CREATININE-BSD FMLA CKD-EPI: >60 ML/MIN/{1.73_M2}
GLUCOSE SERPL-MCNC: 90 MG/DL (ref 70–99)
HCT VFR BLD AUTO: 39.4 % (ref 36–48)
HGB BLD-MCNC: 12.9 G/DL (ref 12–16)
IRON SATN MFR SERPL: 38 % (ref 15–50)
IRON SERPL-MCNC: 116 UG/DL (ref 37–145)
LYMPHOCYTES # BLD: 1.9 K/UL (ref 1–5.1)
LYMPHOCYTES NFR BLD: 28.4 %
MCH RBC QN AUTO: 28.9 PG (ref 26–34)
MCHC RBC AUTO-ENTMCNC: 32.7 G/DL (ref 31–36)
MCV RBC AUTO: 88.3 FL (ref 80–100)
MONOCYTES # BLD: 0.4 K/UL (ref 0–1.3)
MONOCYTES NFR BLD: 6.6 %
NEUTROPHILS # BLD: 4.3 K/UL (ref 1.7–7.7)
NEUTROPHILS NFR BLD: 63.7 %
PLATELET # BLD AUTO: 330 K/UL (ref 135–450)
PMV BLD AUTO: 9.7 FL (ref 5–10.5)
POTASSIUM SERPL-SCNC: 4.2 MMOL/L (ref 3.5–5.1)
PROT SERPL-MCNC: 7.5 G/DL (ref 6.4–8.2)
RBC # BLD AUTO: 4.47 M/UL (ref 4–5.2)
SODIUM SERPL-SCNC: 140 MMOL/L (ref 136–145)
TIBC SERPL-MCNC: 304 UG/DL (ref 260–445)
TSH SERPL DL<=0.005 MIU/L-ACNC: 1.97 UIU/ML (ref 0.27–4.2)
WBC # BLD AUTO: 6.8 K/UL (ref 4–11)

## 2023-05-04 ENCOUNTER — TELEPHONE (OUTPATIENT)
Dept: FAMILY MEDICINE CLINIC | Age: 33
End: 2023-05-04

## 2023-05-05 ENCOUNTER — TELEPHONE (OUTPATIENT)
Dept: ADMINISTRATIVE | Age: 33
End: 2023-05-05